# Patient Record
Sex: FEMALE | Race: WHITE | NOT HISPANIC OR LATINO
[De-identification: names, ages, dates, MRNs, and addresses within clinical notes are randomized per-mention and may not be internally consistent; named-entity substitution may affect disease eponyms.]

---
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
## 2021-03-20 ENCOUNTER — TRANSCRIPTION ENCOUNTER (OUTPATIENT)
Age: 21
End: 2021-03-20

## 2021-03-31 ENCOUNTER — TRANSCRIPTION ENCOUNTER (OUTPATIENT)
Age: 21
End: 2021-03-31

## 2021-04-18 ENCOUNTER — TRANSCRIPTION ENCOUNTER (OUTPATIENT)
Age: 21
End: 2021-04-18

## 2021-04-20 ENCOUNTER — EMERGENCY (EMERGENCY)
Facility: HOSPITAL | Age: 21
LOS: 0 days | Discharge: HOME | End: 2021-04-21
Attending: EMERGENCY MEDICINE | Admitting: EMERGENCY MEDICINE
Payer: MEDICAID

## 2021-04-20 VITALS
WEIGHT: 135.14 LBS | RESPIRATION RATE: 20 BRPM | DIASTOLIC BLOOD PRESSURE: 68 MMHG | TEMPERATURE: 99 F | HEART RATE: 108 BPM | OXYGEN SATURATION: 99 % | SYSTOLIC BLOOD PRESSURE: 133 MMHG

## 2021-04-20 DIAGNOSIS — M54.5 LOW BACK PAIN: ICD-10-CM

## 2021-04-20 LAB
APPEARANCE UR: ABNORMAL
BILIRUB UR-MCNC: NEGATIVE — SIGNIFICANT CHANGE UP
COLOR SPEC: YELLOW — SIGNIFICANT CHANGE UP
D DIMER BLD IA.RAPID-MCNC: 78 NG/ML DDU — SIGNIFICANT CHANGE UP (ref 0–230)
DIFF PNL FLD: NEGATIVE — SIGNIFICANT CHANGE UP
GLUCOSE UR QL: NEGATIVE — SIGNIFICANT CHANGE UP
HCT VFR BLD CALC: 37.9 % — SIGNIFICANT CHANGE UP (ref 37–47)
HGB BLD-MCNC: 12.7 G/DL — SIGNIFICANT CHANGE UP (ref 12–16)
KETONES UR-MCNC: NEGATIVE — SIGNIFICANT CHANGE UP
LEUKOCYTE ESTERASE UR-ACNC: ABNORMAL
MCHC RBC-ENTMCNC: 28.5 PG — SIGNIFICANT CHANGE UP (ref 27–31)
MCHC RBC-ENTMCNC: 33.5 G/DL — SIGNIFICANT CHANGE UP (ref 32–37)
MCV RBC AUTO: 85.2 FL — SIGNIFICANT CHANGE UP (ref 81–99)
NITRITE UR-MCNC: NEGATIVE — SIGNIFICANT CHANGE UP
NRBC # BLD: 0 /100 WBCS — SIGNIFICANT CHANGE UP (ref 0–0)
PH UR: 6.5 — SIGNIFICANT CHANGE UP (ref 5–8)
PLATELET # BLD AUTO: 275 K/UL — SIGNIFICANT CHANGE UP (ref 130–400)
PROT UR-MCNC: SIGNIFICANT CHANGE UP
RBC # BLD: 4.45 M/UL — SIGNIFICANT CHANGE UP (ref 4.2–5.4)
RBC # FLD: 13.2 % — SIGNIFICANT CHANGE UP (ref 11.5–14.5)
SP GR SPEC: 1.02 — SIGNIFICANT CHANGE UP (ref 1.01–1.03)
UROBILINOGEN FLD QL: SIGNIFICANT CHANGE UP
WBC # BLD: 8.11 K/UL — SIGNIFICANT CHANGE UP (ref 4.8–10.8)
WBC # FLD AUTO: 8.11 K/UL — SIGNIFICANT CHANGE UP (ref 4.8–10.8)

## 2021-04-20 PROCEDURE — 99285 EMERGENCY DEPT VISIT HI MDM: CPT

## 2021-04-20 NOTE — ED PEDIATRIC NURSE NOTE - OBJECTIVE STATEMENT
patient complaints of generalized back pain.  Patient denies chest pain, n/v, SOB, and fever. Patient reports being covid+ since march 20.

## 2021-04-20 NOTE — ED PEDIATRIC TRIAGE NOTE - CHIEF COMPLAINT QUOTE
pt c/o lower back pain, worse upon movement and palpation. tested + for covid on 3/20 but tested + again today. denies any cough, fever. sob

## 2021-04-20 NOTE — ED PEDIATRIC NURSE NOTE - GASTROINTESTINAL ASSESSMENT
Increase your fluids. You are getting dehydrated.  If you are feeling worse or have dizziness, weakness, ms cramps go to the ER. Diarrhea would make it more important to go to the ER.    - - -

## 2021-04-20 NOTE — ED PEDIATRIC NURSE NOTE - AGE
Pt BIBA for public EtOH intox. Admits to having heavy EtOH intake today.  Denies drug use or other illicits. No acute medical complaints or apparent trauma noted.  Minimally cooperative with hx and ROS due to heavy intox. No bleeding, respiratory distress, pain, vomiting, or urinary/bowel incontinence noted.
(1) 13 years and above

## 2021-04-20 NOTE — ED PEDIATRIC TRIAGE NOTE - NS ED NURSE BANDS TYPE
Name band;
Jeb Kevin (MD), Surgery  [address]  [address]  [place], [place] [address]  Phone: [phone]  Fax: [phone]

## 2021-04-21 VITALS
SYSTOLIC BLOOD PRESSURE: 124 MMHG | RESPIRATION RATE: 18 BRPM | TEMPERATURE: 98 F | HEART RATE: 72 BPM | DIASTOLIC BLOOD PRESSURE: 70 MMHG | OXYGEN SATURATION: 98 %

## 2021-04-21 LAB
ALBUMIN SERPL ELPH-MCNC: 4.1 G/DL — SIGNIFICANT CHANGE UP (ref 3.5–5.2)
ALP SERPL-CCNC: 70 U/L — SIGNIFICANT CHANGE UP (ref 30–115)
ALT FLD-CCNC: 22 U/L — SIGNIFICANT CHANGE UP (ref 14–37)
ANION GAP SERPL CALC-SCNC: 10 MMOL/L — SIGNIFICANT CHANGE UP (ref 7–14)
AST SERPL-CCNC: 16 U/L — SIGNIFICANT CHANGE UP (ref 14–37)
BACTERIA # UR AUTO: NEGATIVE — SIGNIFICANT CHANGE UP
BILIRUB SERPL-MCNC: 0.4 MG/DL — SIGNIFICANT CHANGE UP (ref 0.2–1.2)
BUN SERPL-MCNC: 10 MG/DL — SIGNIFICANT CHANGE UP (ref 10–20)
CALCIUM SERPL-MCNC: 9.1 MG/DL — SIGNIFICANT CHANGE UP (ref 8.5–10.1)
CHLORIDE SERPL-SCNC: 107 MMOL/L — SIGNIFICANT CHANGE UP (ref 98–110)
CO2 SERPL-SCNC: 23 MMOL/L — SIGNIFICANT CHANGE UP (ref 17–32)
CREAT SERPL-MCNC: 0.7 MG/DL — SIGNIFICANT CHANGE UP (ref 0.3–1)
EPI CELLS # UR: 2 /HPF — SIGNIFICANT CHANGE UP (ref 0–5)
GLUCOSE SERPL-MCNC: 76 MG/DL — SIGNIFICANT CHANGE UP (ref 70–99)
HCG SERPL QL: NEGATIVE — SIGNIFICANT CHANGE UP
HYALINE CASTS # UR AUTO: 1 /LPF — SIGNIFICANT CHANGE UP (ref 0–7)
POTASSIUM SERPL-MCNC: 4.6 MMOL/L — SIGNIFICANT CHANGE UP (ref 3.5–5)
POTASSIUM SERPL-SCNC: 4.6 MMOL/L — SIGNIFICANT CHANGE UP (ref 3.5–5)
PROT SERPL-MCNC: 7.1 G/DL — SIGNIFICANT CHANGE UP (ref 6.1–8)
RBC CASTS # UR COMP ASSIST: 5 /HPF — HIGH (ref 0–4)
SODIUM SERPL-SCNC: 140 MMOL/L — SIGNIFICANT CHANGE UP (ref 135–146)
WBC UR QL: 8 /HPF — HIGH (ref 0–5)

## 2021-04-21 PROCEDURE — 93010 ELECTROCARDIOGRAM REPORT: CPT

## 2021-04-21 PROCEDURE — 71045 X-RAY EXAM CHEST 1 VIEW: CPT | Mod: 26

## 2021-04-21 NOTE — ED PROVIDER NOTE - NSFOLLOWUPCLINICS_GEN_ALL_ED_FT
A Family Medicine Doctor  Family Medicine  .  [place]   Phone:   Fax:   Follow Up Time: 1-3 Days    [facility] ([facility])  Rehabilitation  [facility] [address], [address] [place] [address]  Phone: [phone]  Fax:   Follow Up Time: 1-3 Days    [facility] ([facility])  Rehabilitation  [address] [place] [address]  Phone: [phone]  Fax:   Follow Up Time: 1-3 Days

## 2021-04-21 NOTE — ED PROVIDER NOTE - OBJECTIVE STATEMENT
19 y/o F without significant PMH presents with moderate constant unilateral lower back pain x days, worse with movement/palpation, better with certain positions. recently covid +3/20 and then again today.   no cp/sob/cough/congestion/runny nose.   no fever.

## 2021-04-21 NOTE — ED PROVIDER NOTE - PROGRESS NOTE DETAILS
ODESSA: Reviewed all results and necessity for follow up. Counseled on red flags and to return for them.  Patient appears well on discharge.

## 2021-04-21 NOTE — ED PROVIDER NOTE - NS ED ROS FT
Review of Systems    Constitutional: (-) fever   Eyes/ENT: (-) vision changes  Cardiovascular: (-) chest pain, (-) syncope (-) palpitations  Respiratory: (-) cough, (-) shortness of breath  Gastrointestinal: (-) vomiting, (-) diarrhea (-) abdominal pain  Genitourinary:  (-) dysuria   Musculoskeletal: (-) neck pain, (+) back pain, (-) leg pain/swelling  Integumentary: (-) rash, (-) edema  Neurological: (-) headache, (-) confusion  Hematologic: (-) easy bruising

## 2021-04-21 NOTE — ED PROVIDER NOTE - PATIENT PORTAL LINK FT
You can access the FollowMyHealth Patient Portal offered by [facility] by registering at the following website: [email or web address]. By joining [facility]’s FollowMyHealth portal, you will also be able to view your health information using other applications (apps) compatible with our system.

## 2021-04-21 NOTE — ED PROVIDER NOTE - PHYSICAL EXAMINATION
PHYSICAL EXAM:    GENERAL: Alert, appears stated age, well appearing, non-toxic  SKIN: Warm, pink and dry. MMM.   HEAD: NC   EYE: Normal lids/conjunctiva  ENT: Normal hearing, patent oropharynx   NECK: +supple. No meningismus, or JVD  Pulm: Bilateral BS, normal resp effort, no wheezes, stridor, or retractions  CV: RRR, no M/R/G, 2+and = radial pulses  Abd: soft, non-tender, non-distended, no rebound/guarding.   Mskel: no erythema, cyanosis, edema. no calf tenderness  Neuro: AAOx3, 5/5 strength throughout. normal gait.

## 2021-04-21 NOTE — ED PROVIDER NOTE - ATTENDING CONTRIBUTION TO CARE
I personally evaluated the patient. I reviewed the Resident’s or Physician Assistant’s note (as assigned above), and agree with the findings and plan except as documented in my note.    21 y/o F without significant PMH presents with moderate constant unilateral lower back pain x days, worse with movement. No rash. No fever. No dysuria or hematuria.    CONSTITUTIONAL: Well-developed; well-nourished; in no acute distress. Sitting up and providing appropriate history and physical examination  SKIN: skin exam is warm and dry, no acute rash.  HEAD: Normocephalic; atraumatic.  EYES: PERRL, 3 mm bilateral, no nystagmus, EOM intact; conjunctiva and sclera clear.  ENT: No nasal discharge; airway clear.  NECK: Supple; non tender.+ full passive ROM in all directions. No JVD  CARD: S1, S2 normal; no murmurs, gallops, or rubs. Regular rate and rhythm. + Symmetric Strong Pulses  RESP: No wheezes, rales or rhonchi. Good air movement bilaterally  ABD: soft; non-distended; non-tender. No Rebound, No Gaurding, No signs of peritnitis, No CVA tenderness  EXT: Normal ROM. No clubbing, cyanosis or edema. Dp and Pt Pulses intact. Cap refill less than 3 seconds  Back:  lower paraspinal ttp. No CVA tenderness.

## 2021-11-20 ENCOUNTER — TRANSCRIPTION ENCOUNTER (OUTPATIENT)
Age: 21
End: 2021-11-20

## 2022-02-03 ENCOUNTER — EMERGENCY (EMERGENCY)
Facility: HOSPITAL | Age: 22
LOS: 0 days | Discharge: HOME | End: 2022-02-03
Attending: EMERGENCY MEDICINE | Admitting: EMERGENCY MEDICINE
Payer: MEDICAID

## 2022-02-03 VITALS
DIASTOLIC BLOOD PRESSURE: 59 MMHG | SYSTOLIC BLOOD PRESSURE: 125 MMHG | TEMPERATURE: 98 F | OXYGEN SATURATION: 100 % | HEART RATE: 84 BPM | RESPIRATION RATE: 19 BRPM

## 2022-02-03 VITALS
TEMPERATURE: 99 F | RESPIRATION RATE: 18 BRPM | SYSTOLIC BLOOD PRESSURE: 127 MMHG | DIASTOLIC BLOOD PRESSURE: 60 MMHG | OXYGEN SATURATION: 100 % | HEART RATE: 75 BPM

## 2022-02-03 DIAGNOSIS — R10.13 EPIGASTRIC PAIN: ICD-10-CM

## 2022-02-03 DIAGNOSIS — R10.84 GENERALIZED ABDOMINAL PAIN: ICD-10-CM

## 2022-02-03 DIAGNOSIS — R11.2 NAUSEA WITH VOMITING, UNSPECIFIED: ICD-10-CM

## 2022-02-03 DIAGNOSIS — N39.0 URINARY TRACT INFECTION, SITE NOT SPECIFIED: ICD-10-CM

## 2022-02-03 LAB
ALBUMIN SERPL ELPH-MCNC: 4.2 G/DL — SIGNIFICANT CHANGE UP (ref 3.5–5.2)
ALP SERPL-CCNC: 67 U/L — SIGNIFICANT CHANGE UP (ref 30–115)
ALT FLD-CCNC: 38 U/L — HIGH (ref 14–37)
ANION GAP SERPL CALC-SCNC: 12 MMOL/L — SIGNIFICANT CHANGE UP (ref 7–14)
APPEARANCE UR: ABNORMAL
AST SERPL-CCNC: 48 U/L — HIGH (ref 14–37)
BACTERIA # UR AUTO: ABNORMAL
BASOPHILS # BLD AUTO: 0.01 K/UL — SIGNIFICANT CHANGE UP (ref 0–0.2)
BASOPHILS NFR BLD AUTO: 0.2 % — SIGNIFICANT CHANGE UP (ref 0–1)
BILIRUB DIRECT SERPL-MCNC: <0.2 MG/DL — SIGNIFICANT CHANGE UP (ref 0–0.3)
BILIRUB INDIRECT FLD-MCNC: >0.2 MG/DL — SIGNIFICANT CHANGE UP (ref 0.2–1.2)
BILIRUB SERPL-MCNC: 0.4 MG/DL — SIGNIFICANT CHANGE UP (ref 0.2–1.2)
BILIRUB UR-MCNC: NEGATIVE — SIGNIFICANT CHANGE UP
BUN SERPL-MCNC: 10 MG/DL — SIGNIFICANT CHANGE UP (ref 10–20)
CALCIUM SERPL-MCNC: 9.1 MG/DL — SIGNIFICANT CHANGE UP (ref 8.5–10.1)
CHLORIDE SERPL-SCNC: 102 MMOL/L — SIGNIFICANT CHANGE UP (ref 98–110)
CO2 SERPL-SCNC: 21 MMOL/L — SIGNIFICANT CHANGE UP (ref 17–32)
COLOR SPEC: SIGNIFICANT CHANGE UP
CREAT SERPL-MCNC: 0.6 MG/DL — LOW (ref 0.7–1.5)
DIFF PNL FLD: NEGATIVE — SIGNIFICANT CHANGE UP
EOSINOPHIL # BLD AUTO: 0.05 K/UL — SIGNIFICANT CHANGE UP (ref 0–0.7)
EOSINOPHIL NFR BLD AUTO: 1 % — SIGNIFICANT CHANGE UP (ref 0–8)
EPI CELLS # UR: 6 /HPF — HIGH (ref 0–5)
GLUCOSE SERPL-MCNC: 83 MG/DL — SIGNIFICANT CHANGE UP (ref 70–99)
GLUCOSE UR QL: NEGATIVE — SIGNIFICANT CHANGE UP
HCG SERPL QL: NEGATIVE — SIGNIFICANT CHANGE UP
HCT VFR BLD CALC: 41.3 % — SIGNIFICANT CHANGE UP (ref 37–47)
HGB BLD-MCNC: 13.5 G/DL — SIGNIFICANT CHANGE UP (ref 12–16)
HYALINE CASTS # UR AUTO: 4 /LPF — SIGNIFICANT CHANGE UP (ref 0–7)
IMM GRANULOCYTES NFR BLD AUTO: 0.4 % — HIGH (ref 0.1–0.3)
KETONES UR-MCNC: NEGATIVE — SIGNIFICANT CHANGE UP
LEUKOCYTE ESTERASE UR-ACNC: ABNORMAL
LIDOCAIN IGE QN: 26 U/L — SIGNIFICANT CHANGE UP (ref 7–60)
LYMPHOCYTES # BLD AUTO: 1.81 K/UL — SIGNIFICANT CHANGE UP (ref 1.2–3.4)
LYMPHOCYTES # BLD AUTO: 37.2 % — SIGNIFICANT CHANGE UP (ref 20.5–51.1)
MCHC RBC-ENTMCNC: 28 PG — SIGNIFICANT CHANGE UP (ref 27–31)
MCHC RBC-ENTMCNC: 32.7 G/DL — SIGNIFICANT CHANGE UP (ref 32–37)
MCV RBC AUTO: 85.7 FL — SIGNIFICANT CHANGE UP (ref 81–99)
MONOCYTES # BLD AUTO: 0.51 K/UL — SIGNIFICANT CHANGE UP (ref 0.1–0.6)
MONOCYTES NFR BLD AUTO: 10.5 % — HIGH (ref 1.7–9.3)
NEUTROPHILS # BLD AUTO: 2.47 K/UL — SIGNIFICANT CHANGE UP (ref 1.4–6.5)
NEUTROPHILS NFR BLD AUTO: 50.7 % — SIGNIFICANT CHANGE UP (ref 42.2–75.2)
NITRITE UR-MCNC: NEGATIVE — SIGNIFICANT CHANGE UP
NRBC # BLD: 0 /100 WBCS — SIGNIFICANT CHANGE UP (ref 0–0)
PH UR: 7 — SIGNIFICANT CHANGE UP (ref 5–8)
PLATELET # BLD AUTO: 259 K/UL — SIGNIFICANT CHANGE UP (ref 130–400)
POTASSIUM SERPL-MCNC: 5.3 MMOL/L — HIGH (ref 3.5–5)
POTASSIUM SERPL-SCNC: 5.3 MMOL/L — HIGH (ref 3.5–5)
PROT SERPL-MCNC: 7.4 G/DL — SIGNIFICANT CHANGE UP (ref 6–8)
PROT UR-MCNC: SIGNIFICANT CHANGE UP
RBC # BLD: 4.82 M/UL — SIGNIFICANT CHANGE UP (ref 4.2–5.4)
RBC # FLD: 13 % — SIGNIFICANT CHANGE UP (ref 11.5–14.5)
RBC CASTS # UR COMP ASSIST: 11 /HPF — HIGH (ref 0–4)
SODIUM SERPL-SCNC: 135 MMOL/L — SIGNIFICANT CHANGE UP (ref 135–146)
SP GR SPEC: 1.01 — SIGNIFICANT CHANGE UP (ref 1.01–1.03)
UROBILINOGEN FLD QL: SIGNIFICANT CHANGE UP
WBC # BLD: 4.87 K/UL — SIGNIFICANT CHANGE UP (ref 4.8–10.8)
WBC # FLD AUTO: 4.87 K/UL — SIGNIFICANT CHANGE UP (ref 4.8–10.8)
WBC UR QL: 25 /HPF — HIGH (ref 0–5)

## 2022-02-03 PROCEDURE — 74177 CT ABD & PELVIS W/CONTRAST: CPT | Mod: 26,MA

## 2022-02-03 PROCEDURE — 76705 ECHO EXAM OF ABDOMEN: CPT | Mod: 26

## 2022-02-03 PROCEDURE — 99285 EMERGENCY DEPT VISIT HI MDM: CPT

## 2022-02-03 PROCEDURE — 76856 US EXAM PELVIC COMPLETE: CPT | Mod: 26

## 2022-02-03 RX ORDER — ACETAMINOPHEN 500 MG
650 TABLET ORAL ONCE
Refills: 0 | Status: COMPLETED | OUTPATIENT
Start: 2022-02-03 | End: 2022-02-03

## 2022-02-03 RX ORDER — IOHEXOL 300 MG/ML
30 INJECTION, SOLUTION INTRAVENOUS ONCE
Refills: 0 | Status: COMPLETED | OUTPATIENT
Start: 2022-02-03 | End: 2022-02-03

## 2022-02-03 RX ORDER — CEFPODOXIME PROXETIL 100 MG
1 TABLET ORAL
Qty: 14 | Refills: 0
Start: 2022-02-03 | End: 2022-02-09

## 2022-02-03 RX ORDER — FAMOTIDINE 10 MG/ML
20 INJECTION INTRAVENOUS ONCE
Refills: 0 | Status: COMPLETED | OUTPATIENT
Start: 2022-02-03 | End: 2022-02-03

## 2022-02-03 RX ORDER — SODIUM CHLORIDE 9 MG/ML
1000 INJECTION, SOLUTION INTRAVENOUS ONCE
Refills: 0 | Status: COMPLETED | OUTPATIENT
Start: 2022-02-03 | End: 2022-02-03

## 2022-02-03 RX ORDER — ONDANSETRON 8 MG/1
4 TABLET, FILM COATED ORAL ONCE
Refills: 0 | Status: COMPLETED | OUTPATIENT
Start: 2022-02-03 | End: 2022-02-03

## 2022-02-03 RX ORDER — DIPHENHYDRAMINE HYDROCHLORIDE AND LIDOCAINE HYDROCHLORIDE AND ALUMINUM HYDROXIDE AND MAGNESIUM HYDRO
30 KIT ONCE
Refills: 0 | Status: COMPLETED | OUTPATIENT
Start: 2022-02-03 | End: 2022-02-03

## 2022-02-03 RX ORDER — CEFTRIAXONE 500 MG/1
1000 INJECTION, POWDER, FOR SOLUTION INTRAMUSCULAR; INTRAVENOUS ONCE
Refills: 0 | Status: COMPLETED | OUTPATIENT
Start: 2022-02-03 | End: 2022-02-03

## 2022-02-03 RX ORDER — ONDANSETRON 8 MG/1
1 TABLET, FILM COATED ORAL
Qty: 6 | Refills: 0
Start: 2022-02-03 | End: 2022-02-04

## 2022-02-03 RX ADMIN — CEFTRIAXONE 100 MILLIGRAM(S): 500 INJECTION, POWDER, FOR SOLUTION INTRAMUSCULAR; INTRAVENOUS at 22:24

## 2022-02-03 RX ADMIN — FAMOTIDINE 20 MILLIGRAM(S): 10 INJECTION INTRAVENOUS at 14:36

## 2022-02-03 RX ADMIN — IOHEXOL 30 MILLILITER(S): 300 INJECTION, SOLUTION INTRAVENOUS at 15:13

## 2022-02-03 RX ADMIN — DIPHENHYDRAMINE HYDROCHLORIDE AND LIDOCAINE HYDROCHLORIDE AND ALUMINUM HYDROXIDE AND MAGNESIUM HYDRO 30 MILLILITER(S): KIT at 22:24

## 2022-02-03 RX ADMIN — ONDANSETRON 4 MILLIGRAM(S): 8 TABLET, FILM COATED ORAL at 14:36

## 2022-02-03 RX ADMIN — Medication 650 MILLIGRAM(S): at 14:36

## 2022-02-03 RX ADMIN — SODIUM CHLORIDE 1000 MILLILITER(S): 9 INJECTION, SOLUTION INTRAVENOUS at 14:37

## 2022-02-03 RX ADMIN — SODIUM CHLORIDE 1000 MILLILITER(S): 9 INJECTION, SOLUTION INTRAVENOUS at 19:44

## 2022-02-03 RX ADMIN — Medication 650 MILLIGRAM(S): at 21:22

## 2022-02-03 RX ADMIN — ONDANSETRON 4 MILLIGRAM(S): 8 TABLET, FILM COATED ORAL at 22:24

## 2022-02-03 NOTE — ED PEDIATRIC TRIAGE NOTE - BRAND OF FIRST COVID-19 BOOSTER
Patient Requesting a refill.    Medication(s) for [employer] submitted for a refill request and is pending approval from the Provider.    Caller has been advised that their call does not guarantee an immediate refill. This refill will be reviewed within 24-72 hours by a qualified provider who will determine whether he or she can refill the medication.    Patient has contacted the pharmacy?  Yes    Call Back Number: [phone]    Can a detailed message be left? Yes_No_---: Yes    Additional information:     Patient will run out before her script comes from mail order , asking for a small script sent to local [facility].Patient is requesting be done today so she can  tomorrow while she is out.     Patient’s preferred pharmacy has been noted and populated.       [facility]/pharmacy #[id number] - [place], [place] - [address] [facility] [address] [facility]  [address] [place] [address]  Phone: [phone] Fax: [phone]     [employer]

## 2022-02-03 NOTE — ED PROVIDER NOTE - NS ED ROS FT
CONSTITUTIONAL:  see HPI  SKIN:  no skin rash  EYES:  no visual changes  ENMT: no neck pain or stiffness  CARD:  no chest pain  RESP:  no cough or respiratory distress  ABD:  + abdominal pain, nausea, and vomiting; no diarrhea  :  no dysuria, frequency or burning  MSK:  no new-onset back pain  NEURO:  no headache   Except as documented in the HPI,  all other systems are negative

## 2022-02-03 NOTE — ED PROVIDER NOTE - PROGRESS NOTE DETAILS
TD: Pt reports improvement in epigastric abd pain from 6/10 on arrival to 3/10. Nausea has also improved, now only mild. No emesis. Pt back from ultrasound RUQ, pending bladder being full then will send back for US pelvis. Pt currently drinking contrast for CT abd/pelvis. Will reassess. Patient endorsed to Dr. Perez, will follow. AN: Sign out received from Dr. Hernandez  Pt evaluated by me. Presented with wpigastric abd pain with radiation to the lowr abd. Required labs, meds, imaging. Was found to have a UTI. Pending CT results and will dispo accordingly. AN: Sign out received from Dr. Hernandez  Pt evaluated by me. Presented with lower abd pain. Required labs, meds, imaging. Was found to have a UTI. Pending CT results and will dispo accordingly.

## 2022-02-03 NOTE — ED PEDIATRIC NURSE NOTE - OBJECTIVE STATEMENT
Pt presents to ED c/o pain in the middle of her belly that also goes to her sides; pt also reports she has been vomiting a few times since Saturday.

## 2022-02-03 NOTE — ED PROVIDER NOTE - PATIENT PORTAL LINK FT
You can access the FollowMyHealth Patient Portal offered by [facility] by registering at the following website: [email or web address]. By joining [facility]’s FollowMyHealth portal, you will also be able to view your health information using other applications (apps) compatible with our system.

## 2022-02-03 NOTE — ED PROVIDER NOTE - ATTENDING CONTRIBUTION TO CARE
I personally evaluated the patient. I reviewed the Resident´s or Physician Assistant´s note (as assigned above), and agree with the findings and plan except as documented in my note.  21-year-old female presents to the emergency department for evaluation of abdominal pain that has been present for the last 3 to 4 days.  No fever, positive vomiting, no diarrhea.  No sick contacts.  PMD was contacted over the phone and advised to come to the ED for evaluation.  She was not examined by the PMD.  She describes the pain as being worse in the upper middle part of her abdomen but also going down to her lower abdomen.  She is not sexually active  Physical Exam: VS reviewed. Pt is well appearing, in no respiratory distress. MMM. Cap refill <2 seconds. Skin with no obvious rash noted.  Chest CTA BL, no wheezing, rales or crackles, good air entry BL.  Normal heart sounds, no murmurs appreciated, no reproducible chest wall pain. Abdomen soft, ND, no guarding, + tenderness appreciated throughout abdomen, worse in epigastric region and BL lower quadrants, no CVA tenderness. Neuro exam grossly intact.      Plan: IV, check labs, urine studies, ultrasound right upper quadrant, ultrasound pelvis.  Abdominal and pelvic CT with p.o. and IV contrast

## 2022-02-03 NOTE — ED PROVIDER NOTE - CLINICAL SUMMARY MEDICAL DECISION MAKING FREE TEXT BOX
Pt presented with lower abd pain. Required labs, meds, imaging. Was found to have a UTI. CT results neg. Will dc with outpt f/up.

## 2022-02-03 NOTE — ED PROVIDER NOTE - PHYSICAL EXAMINATION
CONSTITUTIONAL:  NAD  SKIN:  warm, dry  HEAD:  NCAT  EYES:  NL inspection  ENT:  MMM  NECK:  supple; non tender  CARD:  RRR  RESP:  CTAB  ABD:  + TTP primarily epigastrium, mild-moderate RUQ, LUQ, and suprapubic TTP, abd S/ND, no R/G  NEURO:  grossly unremarkable  PSYCH:  cooperative, appropriate

## 2022-02-03 NOTE — ED PROVIDER NOTE - CARE PROVIDER_API CALL
Keerthi Vo  Internal Medicine  [address] [place] [address]  Phone: [phone]  Fax: [phone]  Established Patient  Follow Up Time: 7-10 Days

## 2022-02-03 NOTE — ED PEDIATRIC TRIAGE NOTE - CHIEF COMPLAINT QUOTE
"I have this pain in the middle of my belly going to my sides and I've been vomiting a few times since Saturday." none known

## 2022-02-03 NOTE — ED PEDIATRIC NURSE NOTE - CHIEF COMPLAINT QUOTE
"I have this pain in the middle of my belly going to my sides and I've been vomiting a few times since Saturday."

## 2022-02-03 NOTE — ED PROVIDER NOTE - OBJECTIVE STATEMENT
Pt is a 21F with no pmhx p/w generalized abdominal pain x5d, worst in epigastric and mid-abd regions, sharp in character, worst after eating with associated nausea and 1-2x NBNB emesis per day.  No fever, diarrhea, dysuria/frequency, flank pain, vaginal discharge.  No sick contacts.  PMD was contacted over the phone and advised to come to the ED for evaluation.  She was not examined by the PMD. She is not sexually active currently or in the past, LMP started on 1.23.22 and is almost ended with only minimal spotting currently.

## 2022-02-04 LAB
CULTURE RESULTS: SIGNIFICANT CHANGE UP
SPECIMEN SOURCE: SIGNIFICANT CHANGE UP

## 2022-03-11 ENCOUNTER — APPOINTMENT (OUTPATIENT)
Dept: SURGERY | Facility: CLINIC | Age: 22
End: 2022-03-11
Payer: MEDICAID

## 2022-03-11 VITALS
SYSTOLIC BLOOD PRESSURE: 110 MMHG | DIASTOLIC BLOOD PRESSURE: 77 MMHG | BODY MASS INDEX: 27.21 KG/M2 | HEIGHT: 59 IN | OXYGEN SATURATION: 95 % | TEMPERATURE: 97.3 F | HEART RATE: 94 BPM | WEIGHT: 135 LBS

## 2022-03-11 DIAGNOSIS — Z84.0 FAMILY HISTORY OF DISEASES OF THE SKIN AND SUBCUTANEOUS TISSUE: ICD-10-CM

## 2022-03-11 PROCEDURE — 99203 OFFICE O/P NEW LOW 30 MIN: CPT

## 2022-03-11 NOTE — HISTORY OF PRESENT ILLNESS
[de-identified] : 22yo female with no significant PMHx presenting for evaluation of pilonidal cyst. Patient states she had an initial episode 1-2 years ago, requiring incision and drainage. She now has a draining sore on the sacral region, which is being treated with Augmentin. She denies fever/chills. Complains of localized pain and yellow drainage.

## 2022-03-11 NOTE — ASSESSMENT
[FreeTextEntry1] : 22yo female with no significant PMHx presenting with pilonidal cyst.\par -risks, benefits, and alteratives were explained to the patient - including risk of bleeding, pain, and infection\par -all questions were answered\par -consent was obtained for EXCISION OF PILONIDAL CYST\par -waive PAST's\par -return to office post-operatively\par

## 2022-03-11 NOTE — PHYSICAL EXAM
[Normal Breath Sounds] : Normal breath sounds [Normal Heart Sounds] : normal heart sounds [Calm] : calm [de-identified] : no acute distress [de-identified] : n [de-identified] : soft [de-identified] : sacral induration, no expressible drainage, no erythema, localized tenderness mild, few sinus tracts, +hirsuitism [de-identified] : full ROM x 4

## 2022-03-25 ENCOUNTER — NON-APPOINTMENT (OUTPATIENT)
Age: 22
End: 2022-03-25

## 2022-03-28 ENCOUNTER — LABORATORY RESULT (OUTPATIENT)
Age: 22
End: 2022-03-28

## 2022-03-30 NOTE — ASU PATIENT PROFILE, ADULT - FALL HARM RISK - UNIVERSAL INTERVENTIONS
Bed in lowest position, wheels locked, appropriate side rails in place/Call bell, personal items and telephone in reach/Instruct patient to call for assistance before getting out of bed or chair/Non-slip footwear when patient is out of bed/[place] to call system/Physically safe environment - no spills, clutter or unnecessary equipment/Purposeful Proactive Rounding/Room/bathroom lighting operational, light cord in reach

## 2022-03-31 ENCOUNTER — INPATIENT (INPATIENT)
Facility: HOSPITAL | Age: 22
LOS: 1 days | Discharge: HOME | End: 2022-04-02
Attending: STUDENT IN AN ORGANIZED HEALTH CARE EDUCATION/TRAINING PROGRAM | Admitting: STUDENT IN AN ORGANIZED HEALTH CARE EDUCATION/TRAINING PROGRAM
Payer: MEDICAID

## 2022-03-31 ENCOUNTER — RESULT REVIEW (OUTPATIENT)
Age: 22
End: 2022-03-31

## 2022-03-31 ENCOUNTER — TRANSCRIPTION ENCOUNTER (OUTPATIENT)
Age: 22
End: 2022-03-31

## 2022-03-31 VITALS
TEMPERATURE: 99 F | HEART RATE: 74 BPM | DIASTOLIC BLOOD PRESSURE: 76 MMHG | OXYGEN SATURATION: 99 % | RESPIRATION RATE: 18 BRPM | WEIGHT: 139.99 LBS | SYSTOLIC BLOOD PRESSURE: 118 MMHG | HEIGHT: 60 IN

## 2022-03-31 DIAGNOSIS — K21.9 GASTRO-ESOPHAGEAL REFLUX DISEASE WITHOUT ESOPHAGITIS: ICD-10-CM

## 2022-03-31 DIAGNOSIS — L05.91 PILONIDAL CYST WITHOUT ABSCESS: ICD-10-CM

## 2022-03-31 DIAGNOSIS — G92.9 UNSPECIFIED TOXIC ENCEPHALOPATHY: ICD-10-CM

## 2022-03-31 DIAGNOSIS — Z98.890 OTHER SPECIFIED POSTPROCEDURAL STATES: Chronic | ICD-10-CM

## 2022-03-31 DIAGNOSIS — T41.45XA ADVERSE EFFECT OF UNSPECIFIED ANESTHETIC, INITIAL ENCOUNTER: ICD-10-CM

## 2022-03-31 LAB
ANION GAP SERPL CALC-SCNC: 14 MMOL/L — SIGNIFICANT CHANGE UP (ref 7–14)
BASOPHILS # BLD AUTO: 0.01 K/UL — SIGNIFICANT CHANGE UP (ref 0–0.2)
BASOPHILS NFR BLD AUTO: 0.1 % — SIGNIFICANT CHANGE UP (ref 0–1)
BUN SERPL-MCNC: 9 MG/DL — LOW (ref 10–20)
CALCIUM SERPL-MCNC: 9.4 MG/DL — SIGNIFICANT CHANGE UP (ref 8.5–10.1)
CHLORIDE SERPL-SCNC: 106 MMOL/L — SIGNIFICANT CHANGE UP (ref 98–110)
CO2 SERPL-SCNC: 21 MMOL/L — SIGNIFICANT CHANGE UP (ref 17–32)
CREAT SERPL-MCNC: 0.6 MG/DL — LOW (ref 0.7–1.5)
EGFR: 132 ML/MIN/1.73M2 — SIGNIFICANT CHANGE UP
EOSINOPHIL # BLD AUTO: 0 K/UL — SIGNIFICANT CHANGE UP (ref 0–0.7)
EOSINOPHIL NFR BLD AUTO: 0 % — SIGNIFICANT CHANGE UP (ref 0–8)
GLUCOSE SERPL-MCNC: 110 MG/DL — HIGH (ref 70–99)
HCT VFR BLD CALC: 39.9 % — SIGNIFICANT CHANGE UP (ref 37–47)
HGB BLD-MCNC: 13.4 G/DL — SIGNIFICANT CHANGE UP (ref 12–16)
IMM GRANULOCYTES NFR BLD AUTO: 0.6 % — HIGH (ref 0.1–0.3)
LYMPHOCYTES # BLD AUTO: 0.95 K/UL — LOW (ref 1.2–3.4)
LYMPHOCYTES # BLD AUTO: 11.7 % — LOW (ref 20.5–51.1)
MAGNESIUM SERPL-MCNC: 1.8 MG/DL — SIGNIFICANT CHANGE UP (ref 1.8–2.4)
MCHC RBC-ENTMCNC: 28.2 PG — SIGNIFICANT CHANGE UP (ref 27–31)
MCHC RBC-ENTMCNC: 33.6 G/DL — SIGNIFICANT CHANGE UP (ref 32–37)
MCV RBC AUTO: 83.8 FL — SIGNIFICANT CHANGE UP (ref 81–99)
MONOCYTES # BLD AUTO: 0.14 K/UL — SIGNIFICANT CHANGE UP (ref 0.1–0.6)
MONOCYTES NFR BLD AUTO: 1.7 % — SIGNIFICANT CHANGE UP (ref 1.7–9.3)
NEUTROPHILS # BLD AUTO: 6.95 K/UL — HIGH (ref 1.4–6.5)
NEUTROPHILS NFR BLD AUTO: 85.9 % — HIGH (ref 42.2–75.2)
NRBC # BLD: 0 /100 WBCS — SIGNIFICANT CHANGE UP (ref 0–0)
PHOSPHATE SERPL-MCNC: 3.4 MG/DL — SIGNIFICANT CHANGE UP (ref 2.1–4.9)
PLATELET # BLD AUTO: 321 K/UL — SIGNIFICANT CHANGE UP (ref 130–400)
POTASSIUM SERPL-MCNC: 4.2 MMOL/L — SIGNIFICANT CHANGE UP (ref 3.5–5)
POTASSIUM SERPL-SCNC: 4.2 MMOL/L — SIGNIFICANT CHANGE UP (ref 3.5–5)
RBC # BLD: 4.76 M/UL — SIGNIFICANT CHANGE UP (ref 4.2–5.4)
RBC # FLD: 13.2 % — SIGNIFICANT CHANGE UP (ref 11.5–14.5)
SODIUM SERPL-SCNC: 141 MMOL/L — SIGNIFICANT CHANGE UP (ref 135–146)
WBC # BLD: 8.1 K/UL — SIGNIFICANT CHANGE UP (ref 4.8–10.8)
WBC # FLD AUTO: 8.1 K/UL — SIGNIFICANT CHANGE UP (ref 4.8–10.8)

## 2022-03-31 PROCEDURE — 88304 TISSUE EXAM BY PATHOLOGIST: CPT | Mod: 26

## 2022-03-31 PROCEDURE — 11770 EXC PILONIDAL CYST SIMPLE: CPT

## 2022-03-31 PROCEDURE — 99232 SBSQ HOSP IP/OBS MODERATE 35: CPT | Mod: 25,GC

## 2022-03-31 RX ORDER — IBUPROFEN 200 MG
400 TABLET ORAL EVERY 8 HOURS
Refills: 0 | Status: DISCONTINUED | OUTPATIENT
Start: 2022-03-31 | End: 2022-04-01

## 2022-03-31 RX ORDER — ACETAMINOPHEN 500 MG
650 TABLET ORAL EVERY 6 HOURS
Refills: 0 | Status: DISCONTINUED | OUTPATIENT
Start: 2022-03-31 | End: 2022-04-01

## 2022-03-31 RX ORDER — IBUPROFEN 200 MG
1 TABLET ORAL
Qty: 9 | Refills: 0
Start: 2022-03-31 | End: 2022-04-02

## 2022-03-31 RX ORDER — PANTOPRAZOLE SODIUM 20 MG/1
40 TABLET, DELAYED RELEASE ORAL
Refills: 0 | Status: DISCONTINUED | OUTPATIENT
Start: 2022-03-31 | End: 2022-04-01

## 2022-03-31 RX ORDER — OXYCODONE AND ACETAMINOPHEN 5; 325 MG/1; MG/1
1 TABLET ORAL EVERY 4 HOURS
Refills: 0 | Status: DISCONTINUED | OUTPATIENT
Start: 2022-03-31 | End: 2022-03-31

## 2022-03-31 RX ORDER — ACETAMINOPHEN 500 MG
2 TABLET ORAL
Qty: 40 | Refills: 0
Start: 2022-03-31 | End: 2022-04-04

## 2022-03-31 RX ORDER — ENOXAPARIN SODIUM 100 MG/ML
40 INJECTION SUBCUTANEOUS EVERY 24 HOURS
Refills: 0 | Status: DISCONTINUED | OUTPATIENT
Start: 2022-03-31 | End: 2022-04-02

## 2022-03-31 RX ORDER — MORPHINE SULFATE 50 MG/1
2 CAPSULE, EXTENDED RELEASE ORAL
Refills: 0 | Status: DISCONTINUED | OUTPATIENT
Start: 2022-03-31 | End: 2022-03-31

## 2022-03-31 RX ORDER — OMEPRAZOLE 10 MG/1
0 CAPSULE, DELAYED RELEASE ORAL
Qty: 0 | Refills: 0 | DISCHARGE

## 2022-03-31 RX ORDER — ERGOCALCIFEROL 1.25 MG/1
0 CAPSULE ORAL
Qty: 0 | Refills: 0 | DISCHARGE

## 2022-03-31 RX ORDER — SODIUM CHLORIDE 9 MG/ML
1000 INJECTION, SOLUTION INTRAVENOUS
Refills: 0 | Status: DISCONTINUED | OUTPATIENT
Start: 2022-03-31 | End: 2022-04-01

## 2022-03-31 RX ORDER — CHLORHEXIDINE GLUCONATE 213 G/1000ML
1 SOLUTION TOPICAL
Refills: 0 | Status: DISCONTINUED | OUTPATIENT
Start: 2022-03-31 | End: 2022-04-02

## 2022-03-31 RX ADMIN — SODIUM CHLORIDE 75 MILLILITER(S): 9 INJECTION, SOLUTION INTRAVENOUS at 13:54

## 2022-03-31 RX ADMIN — Medication 400 MILLIGRAM(S): at 22:30

## 2022-03-31 RX ADMIN — Medication 400 MILLIGRAM(S): at 22:02

## 2022-03-31 NOTE — ASU DISCHARGE PLAN (ADULT/PEDIATRIC) - CARE PROVIDER_API CALL
Roro Valverde)  [facility]  [address] [place] [address], [address] [place] [address]  Phone: [phone]  Fax: [phone]  Follow Up Time:

## 2022-03-31 NOTE — ASU DISCHARGE PLAN (ADULT/PEDIATRIC) - NS MD DC FALL RISK RISK
For information on Fall & Injury Prevention, visit: [email or web address] OR  [email or web address] OR  https://www.cdc.gov/steadi/patient.html

## 2022-03-31 NOTE — H&P ADULT - NSHPPHYSICALEXAM_GEN_ALL_CORE
PHYSICAL EXAM:  GENERAL: AA, oriented to person, not to place and time  HEAD:  Atraumatic, Normocephalic  CHEST/LUNG: CTAx2  HEART: Regular rate and rhythm.   ABDOMEN: Soft, non-tender, non-distended  EXTREMITIES: No clubbing, cyanosis, or edema, intact ROM  BACK: post surgical wound with dermabond clean

## 2022-03-31 NOTE — ASU DISCHARGE PLAN (ADULT/PEDIATRIC) - CALL YOUR DOCTOR IF YOU HAVE ANY OF THE FOLLOWING:
Bleeding that does not stop/Pain not relieved by Medications/Fever greater than (need to indicate Fahrenheit or Celsius)/Wound/Surgical Site with redness, or foul smelling discharge or pus Negative

## 2022-03-31 NOTE — H&P ADULT - HISTORY OF PRESENT ILLNESS
22 y/o female w PMH of pilonidal cyst s/p pilonidal cyst removal today that is currently presenting with AMS in the PACU. Pt evaluated at bedside with mother who reports patient has been confused and delirious post operative. As per the CRNA the patient experienced an episode of alteration with SOB, hyperventilation and tachycardia that resolved after administering Precedex. Afterwards, the patient was seen sleeping in the PACU and when she woke up was scared and confused about her surroundings. The mother denies any previous similar episodes, but notes that this is note her baseline.

## 2022-03-31 NOTE — BRIEF OPERATIVE NOTE - OPERATION/FINDINGS
Pilonidal cyst removal with Karydakis flap technique. Subcuticular monocryl for skin closure with dermabond.

## 2022-03-31 NOTE — ASU DISCHARGE PLAN (ADULT/PEDIATRIC) - ASU DC SPECIAL INSTRUCTIONSFT
-Diet: Continue regular diet.  -Activity: Avoid heavy lifting or straining (anything over 10-15 pounds) for at least 6 weeks. You may ambulate and otherwise resume normal daily activities as tolerated. Use cold packs and sit on pillow for comfort.  -Incisions: Keep area dry. You may shower and allow soap and water to rinse over incisions. Please avoid scrubbing the areas and do not submerge your incisions in water for at least 2 weeks.  -Medications: You may resume your home medications. You may take Tylenol 650mg every 6 hours and alternate with Ibuprofen 600mg every 8 hours for pain; prescription has been sent to your pharmacy.  -Follow-up: Please call the office to schedule a follow-up appointment with Dr. Valverde within 1-2 weeks.  -Please call the office or return to the ED with persistent fever greater than 100.4T, bloody bowel movements, pain not controlled with medication, purulent secretions or wound opening.

## 2022-03-31 NOTE — CHART NOTE - NSCHARTNOTEFT_GEN_A_CORE
PACU ANESTHESIA ADMISSION NOTE      Procedure: Excision of pilonidal cyst  Post op diagnosis:      ____  Intubated  TV:______       Rate: ______      FiO2: ______    _x___  Patent Airway    _x___  Full return of protective reflexes    ___  Full recovery from anesthesia / back to baseline status    Vitals:            T:  98.1              BP :  124/69              R: 14             Sat:  100%             P: 75      Mental Status:  _x___ Awake   _____ Alert   _____ Drowsy   _____ Sedated    Nausea/Vomiting:  _x___  NO       ______Yes,   See Post - Op Orders         Pain Scale (0-10):  __0___    Treatment: ___ None    _x___ See Post - Op/PCA Orders    Post - Operative Fluids:   __x__ Oral   ____ See Post - Op Orders    Plan: Discharge:   _x___Home       _____Floor     _____Critical Care    _____  Other:_________________    Comments:  No anesthesia issues or complications noted.  Discharge when criteria met.

## 2022-03-31 NOTE — PATIENT PROFILE ADULT - FALL HARM RISK - HARM RISK INTERVENTIONS

## 2022-03-31 NOTE — PRE-ANESTHESIA EVALUATION ADULT - HEIGHT IN CM
Patient with chronic alcohol abuse, drinking up to 20 beers per day.  Blood alcohol level 164 upon presentation to the emergency room.  Patient was on CIWA protocol, had CIWA score up to 8 but currently down to 0.  No more Ativan 2 PM yesterday.  Patient received Ativan per protocol, folic acid, thiamine.  Patient was evaluated by chemical dependency counselor, outpatient resources information was given to the patient.  Patient will be discharged on naltrexone 50 mg daily.   152.4

## 2022-03-31 NOTE — H&P ADULT - ASSESSMENT
20 y/o female w PMH of pilonidal cyst s/p pilonidal cyst removal today that is currently presenting with AMS in the PACU. Pt evaluated at bedside AA, oriented to person, not to place and time, VS: WNL, no labs and no imaging. Pt is currently not back to her baseline and will need to be admitted for further monitoring.     PLAN:  -admit to general surgery service  -regular diet  -must not be supine, keep on lateral recumbent position  -pain control  -monitoring of any changes in mental status    spectra [phone]

## 2022-03-31 NOTE — H&P ADULT - ATTENDING COMMENTS
22yo female with PMHx of pilonidal cyst s/p excision of pilonidal cyst POD#0 with post-operative delirium. Labs sent and reviewed - electrolytes WNL. Neurology consult. Pain control. Avoid narcotics or other delirium-inducing medications. Admit for observation and hydration.

## 2022-04-01 DIAGNOSIS — R41.0 DISORIENTATION, UNSPECIFIED: ICD-10-CM

## 2022-04-01 LAB
ANION GAP SERPL CALC-SCNC: 13 MMOL/L — SIGNIFICANT CHANGE UP (ref 7–14)
BASOPHILS # BLD AUTO: 0.04 K/UL — SIGNIFICANT CHANGE UP (ref 0–0.2)
BASOPHILS NFR BLD AUTO: 0.5 % — SIGNIFICANT CHANGE UP (ref 0–1)
BUN SERPL-MCNC: 15 MG/DL — SIGNIFICANT CHANGE UP (ref 10–20)
CALCIUM SERPL-MCNC: 8.7 MG/DL — SIGNIFICANT CHANGE UP (ref 8.5–10.1)
CHLORIDE SERPL-SCNC: 108 MMOL/L — SIGNIFICANT CHANGE UP (ref 98–110)
CO2 SERPL-SCNC: 21 MMOL/L — SIGNIFICANT CHANGE UP (ref 17–32)
CREAT SERPL-MCNC: 0.8 MG/DL — SIGNIFICANT CHANGE UP (ref 0.7–1.5)
EGFR: 108 ML/MIN/1.73M2 — SIGNIFICANT CHANGE UP
EOSINOPHIL # BLD AUTO: 0.05 K/UL — SIGNIFICANT CHANGE UP (ref 0–0.7)
EOSINOPHIL NFR BLD AUTO: 0.6 % — SIGNIFICANT CHANGE UP (ref 0–8)
GLUCOSE SERPL-MCNC: 90 MG/DL — SIGNIFICANT CHANGE UP (ref 70–99)
HCT VFR BLD CALC: 36.2 % — LOW (ref 37–47)
HGB BLD-MCNC: 11.8 G/DL — LOW (ref 12–16)
IMM GRANULOCYTES NFR BLD AUTO: 0.4 % — HIGH (ref 0.1–0.3)
LYMPHOCYTES # BLD AUTO: 3.48 K/UL — HIGH (ref 1.2–3.4)
LYMPHOCYTES # BLD AUTO: 41 % — SIGNIFICANT CHANGE UP (ref 20.5–51.1)
MAGNESIUM SERPL-MCNC: 2.1 MG/DL — SIGNIFICANT CHANGE UP (ref 1.8–2.4)
MCHC RBC-ENTMCNC: 27.7 PG — SIGNIFICANT CHANGE UP (ref 27–31)
MCHC RBC-ENTMCNC: 32.6 G/DL — SIGNIFICANT CHANGE UP (ref 32–37)
MCV RBC AUTO: 85 FL — SIGNIFICANT CHANGE UP (ref 81–99)
MONOCYTES # BLD AUTO: 0.78 K/UL — HIGH (ref 0.1–0.6)
MONOCYTES NFR BLD AUTO: 9.2 % — SIGNIFICANT CHANGE UP (ref 1.7–9.3)
NEUTROPHILS # BLD AUTO: 4.11 K/UL — SIGNIFICANT CHANGE UP (ref 1.4–6.5)
NEUTROPHILS NFR BLD AUTO: 48.3 % — SIGNIFICANT CHANGE UP (ref 42.2–75.2)
NRBC # BLD: 0 /100 WBCS — SIGNIFICANT CHANGE UP (ref 0–0)
PHOSPHATE SERPL-MCNC: 4.3 MG/DL — SIGNIFICANT CHANGE UP (ref 2.1–4.9)
PLATELET # BLD AUTO: 306 K/UL — SIGNIFICANT CHANGE UP (ref 130–400)
POTASSIUM SERPL-MCNC: 3.9 MMOL/L — SIGNIFICANT CHANGE UP (ref 3.5–5)
POTASSIUM SERPL-SCNC: 3.9 MMOL/L — SIGNIFICANT CHANGE UP (ref 3.5–5)
RBC # BLD: 4.26 M/UL — SIGNIFICANT CHANGE UP (ref 4.2–5.4)
RBC # FLD: 13.3 % — SIGNIFICANT CHANGE UP (ref 11.5–14.5)
SODIUM SERPL-SCNC: 142 MMOL/L — SIGNIFICANT CHANGE UP (ref 135–146)
WBC # BLD: 8.49 K/UL — SIGNIFICANT CHANGE UP (ref 4.8–10.8)
WBC # FLD AUTO: 8.49 K/UL — SIGNIFICANT CHANGE UP (ref 4.8–10.8)

## 2022-04-01 PROCEDURE — 99222 1ST HOSP IP/OBS MODERATE 55: CPT

## 2022-04-01 PROCEDURE — 70450 CT HEAD/BRAIN W/O DYE: CPT | Mod: 26

## 2022-04-01 PROCEDURE — 90792 PSYCH DIAG EVAL W/MED SRVCS: CPT

## 2022-04-01 PROCEDURE — 95819 EEG AWAKE AND ASLEEP: CPT | Mod: 26

## 2022-04-01 RX ORDER — ACETAMINOPHEN 500 MG
650 TABLET ORAL EVERY 6 HOURS
Refills: 0 | Status: DISCONTINUED | OUTPATIENT
Start: 2022-04-01 | End: 2022-04-02

## 2022-04-01 RX ORDER — IBUPROFEN 200 MG
400 TABLET ORAL EVERY 8 HOURS
Refills: 0 | Status: DISCONTINUED | OUTPATIENT
Start: 2022-04-01 | End: 2022-04-02

## 2022-04-01 RX ORDER — PANTOPRAZOLE SODIUM 20 MG/1
40 TABLET, DELAYED RELEASE ORAL
Refills: 0 | Status: DISCONTINUED | OUTPATIENT
Start: 2022-04-01 | End: 2022-04-02

## 2022-04-01 RX ADMIN — Medication 400 MILLIGRAM(S): at 21:04

## 2022-04-01 RX ADMIN — Medication 650 MILLIGRAM(S): at 09:24

## 2022-04-01 RX ADMIN — Medication 650 MILLIGRAM(S): at 17:50

## 2022-04-01 RX ADMIN — Medication 650 MILLIGRAM(S): at 23:26

## 2022-04-01 RX ADMIN — Medication 400 MILLIGRAM(S): at 13:39

## 2022-04-01 RX ADMIN — ENOXAPARIN SODIUM 40 MILLIGRAM(S): 100 INJECTION SUBCUTANEOUS at 23:26

## 2022-04-01 RX ADMIN — PANTOPRAZOLE SODIUM 40 MILLIGRAM(S): 20 TABLET, DELAYED RELEASE ORAL at 09:24

## 2022-04-01 NOTE — BEHAVIORAL HEALTH ASSESSMENT NOTE - NSBHADMITCOUNSEL_PSY_A_CORE
diagnostic results/impressions and/or recommended studies/risks and benefits of treatment options/importance of adherence to chosen treatment/risk factor reduction/client/family/caregiver education

## 2022-04-01 NOTE — BEHAVIORAL HEALTH ASSESSMENT NOTE - HPI (INCLUDE ILLNESS QUALITY, SEVERITY, DURATION, TIMING, CONTEXT, MODIFYING FACTORS, ASSOCIATED SIGNS AND SYMPTOMS)
Pt is a 23 yo F, a college student, with no prior psych history, no JENNIFER, who had a surgical procedure Pt is a 21 yo F, a college student, with no prior psych history, no JENNIFER, who had a surgical procedure. She was evaluated by surgical team this AM. As per surgery resident "Pt is lethargic, alert and oriented to person and place, but not date or time. When asked why she is in the hospital she reports "because they're keeping me." She appears internally preoccupied, looked at the wall, staring with detached blunted affect, tearful at times. She reports not feeling confused, but hearing voices telling her to leave the hospital, and that she not safe here." Psych eval was requested. Pt provided information. I also spoke to her dad and a family friend, who is RN with pt's permission (they both at pt's bedside)for collateral information. Pt is fully oriented at this time. She is not aware of her previous deficits. She says that she is doing much better. Denies feeling depressed. Adamantly denies SI/HI/AH/PI.  Says that she sleeps all the time after the surgery (pt is not sedated at this time). Says that she wants to go home. Her medical situation and possible Dx of delirium were d/w her, including the risks. Pt agreed to cooperate with the medical/surgical team and to stay in hospital for further observation and treatment. Pt is logical and processes information well. As per collateral information, pt had fluctuation of symptoms and cognition this AM and appeared to be confused and psychotic at some moments and doing well at others.

## 2022-04-01 NOTE — BEHAVIORAL HEALTH ASSESSMENT NOTE - SUMMARY
pt with no h/o psych problems or JENNIFER had AMS after surgery, with fluctuation of cognitive deficits and symptoms (confusion, AH/VH, lethargy). Pt is fully oriented, logical at this time. Denies SI/HI/AH/PI. Pt wanted to leave, but agreed to stay after our session.

## 2022-04-01 NOTE — CONSULT NOTE ADULT - ATTENDING COMMENTS
Patient seen and examined and agree with above except as noted.  Patients history, notes, labs, imaging, vitals and meds reviewed personally.  Patient appeared paranoid and responding to internal stimuli.  She became hesitant to have me examine her and begins to cry  REEG done STAT and normal  Possibly acute psychosis post procedure (possibly medication related)    Plan  1. If does not return back to baseline over next 12-24 hours would send for MRI brain w/w/o TAVIA  2. Send TSH, B12, urine toxicology

## 2022-04-01 NOTE — BEHAVIORAL HEALTH ASSESSMENT NOTE - PERCEPTIONS
Patient requesting xanax to be sent to [facility] [address]  Previous [facility] [address] and they cannot transfer the partial rx refills.    Please send new rx to correct pharmacy [facility] [address]    No abnormalities

## 2022-04-01 NOTE — PROGRESS NOTE ADULT - ATTENDING COMMENTS
22yo female with PMHx of pilonidal cyst s/p excision of pilonidal cyst POD#1 with post-operative delirium. Patient is complaining of hearing echos. On exam, patient is still confused and speaking with infantile affect. Wound is healing well. Patient is tolerating diet. Labs sent and reviewed - electrolytes WNL. Neurology consulted - recommending EEG. Pain control. Avoid narcotics or other delirium-inducing medications. CT head. Will call psych and anesthesia. Continue to observe.

## 2022-04-01 NOTE — CONSULT NOTE ADULT - ASSESSMENT
On evaluation, patient appears confused, internally preoccupied, with nursing reports that indicate the patient at times is more alert and talkative that others. Concern for seizure vs delirium. Neurology recommends    - Stat EEG      **full attending recommendations to follow** On evaluation, patient appears confused, internally preoccupied, with nursing reports that indicate the patient at times is more alert and talkative that others. Concern for seizure vs delirium.     - Stat EEG didn't reveal any abnormalities.  - Most likely anesthesia induced psychosis, if doesn't improve in 24 hours please obtain MRI of brain w/wo TAVIA  - COnsider Psychiatry consult.

## 2022-04-01 NOTE — PROGRESS NOTE ADULT - ASSESSMENT
A/P: 22 y/o female  s/p pilonidal cyst removal POD#1 admitted for AMS   Pt is stable,  alert and oriented x 3   continue current management   neurology consult  continue close observation A/P: 22 y/o female  s/p pilonidal cyst removal POD#1 admitted for AMS   Pt is stable, feeling better  alert and oriented x 3   continue current management   neurology consult  continue close observation

## 2022-04-01 NOTE — CONSULT NOTE ADULT - SUBJECTIVE AND OBJECTIVE BOX
Neurology Consult    Patient is a 21y old  Female who presents with a chief complaint of pilonidal cyst (01 Apr 2022 09:23)    Patient seen and evaluated. Mother at bedside provides the majority of history. On approach, patient is seen sleeping, with notable rhythmic consistent twitch of left eyebrow noted. She is lethargic, alert and oriented to person and place, but not date or time. When asked why she is in the hospital she reports "because they're keeping me." She appears internally preoccupied, looked at the wall, staring with detached blunted affect, tearful at times. She reports not feeling confused, but hearing voices telling her to leave the hospital, and that she not safe here. She becomes very tearful when physical exam is attempted, with mother reporting she has been fearful of men since her procedure and "is not like this."    Per mother, patient has been at functional baseline prior to procedure; she attends college, performs well in classes. She denies history of seizure, mental illness in the family or patient. She does not believe the patient has had any traumatic experiences with men or ever used substances.       HPI:  22 y/o female w PMH of pilonidal cyst s/p pilonidal cyst removal today that is currently presenting with AMS in the PACU. Pt evaluated at bedside with mother who reports patient has been confused and delirious post operative. As per the CRNA the patient experienced an episode of alteration with SOB, hyperventilation and tachycardia that resolved after administering Precedex. Afterwards, the patient was seen sleeping in the PACU and when she woke up was scared and confused about her surroundings. The mother denies any previous similar episodes, but notes that this is note her baseline.  (31 Mar 2022 19:32)      PAST MEDICAL & SURGICAL HISTORY:  GERD (gastroesophageal reflux disease)    Pilonidal cyst    S/P surgical removal of pilonidal cyst        FAMILY HISTORY:      Social History: (-) x 3    Allergies    No Known Allergies    Intolerances        MEDICATIONS  (STANDING):  acetaminophen     Tablet .. 650 milliGRAM(s) Oral every 6 hours  chlorhexidine 4% Liquid 1 Application(s) Topical <User Schedule>  enoxaparin Injectable 40 milliGRAM(s) SubCutaneous every 24 hours  ibuprofen  Tablet. 400 milliGRAM(s) Oral every 8 hours  pantoprazole    Tablet 40 milliGRAM(s) Oral before breakfast    MEDICATIONS  (PRN):      Review of systems:    Constitutional: as per HPI  Eyes: No eye pain or discharge  ENMT:  No difficulty hearing; No sinus or throat pain  Neck: No pain or stiffness  Respiratory: No cough, wheezing, chills or hemoptysis  Cardiovascular: No chest pain, palpitations, shortness of breath, dyspnea on exertion  Gastrointestinal: No abdominal pain, nausea, vomiting or hematemesis; No diarrhea or constipation.   Genitourinary: No dysuria, frequency, hematuria or incontinence  Neurological: As per HPI  Skin: No rashes or lesions   Endocrine: No heat or cold intolerance; No hair loss  Musculoskeletal: No joint pain or swelling  Psychiatric: No depression, anxiety, mood swings  Heme/Lymph: No easy bruising or bleeding gums    Vital Signs Last 24 Hrs  T(C): 35.9 (01 Apr 2022 08:19), Max: 37.1 (31 Mar 2022 11:45)  T(F): 96.7 (01 Apr 2022 08:19), Max: 98.8 (31 Mar 2022 11:45)  HR: 82 (01 Apr 2022 08:19) (57 - 82)  BP: 109/56 (01 Apr 2022 08:19) (98/56 - 138/63)  BP(mean): --  RR: 17 (01 Apr 2022 08:19) (12 - 57)  SpO2: 99% (01 Apr 2022 00:31) (96% - 100%)    Examination:  General:  Appearance is consistent with chronologic age.  No abnormal facies.  Gross skin survey within normal limits.    Cognitive/Language:  The patient is oriented to person and place, not time, date, or situation at time of exam.  Recent and remote memory intact.  Fund of knowledge is intact and normal.  Language abnormal, with limited interaction, unable to complete full examination of language secondary to mental status.  Nondysarthric.    Eyes: intact VA, VFF.  EOMI w/o nystagmus, skew or reported double vision.  PERRL.  No ptosis/weakness of eyelid closure. brow twitching noticed above left eye  Face:  Facial sensation normal V1 - 3, no facial asymmetry.    Ears/Nose/Throat:  Hearing grossly intact b/l.  Palate elevates midline.  Tongue and uvula midline.   Motor examination:   Normal tone, bulk and range of motion.  No tenderness, twitching, tremors or involuntary movements.  Formal Muscle Strength Testing: (MRC grade R/L) 5/5 UE; 5/5 LE.  No observable drift.  Reflexes:   2+ b/l pectoralis, biceps, triceps, brachioradialis, patella and Achilles.  Plantar response downgoing b/l.  Jaw jerk, Charlie, clonus absent.  Sensory examination:   Intact to light touch and pinprick, pain, temperature and proprioception and vibration in all extremities.  Cerebellum:   FTN/HKS not assessed secondary to mental status. Gait not assessed    Respiratory:  no audible wheezing or inspiratory stridor.  no use of accessory muscles.   Cardiac: pulse palpable, no audible bruits  Abdomen: supple, no guarding, no TTP    Labs:   CBC Full  -  ( 31 Mar 2022 20:17 )  WBC Count : 8.10 K/uL  RBC Count : 4.76 M/uL  Hemoglobin : 13.4 g/dL  Hematocrit : 39.9 %  Platelet Count - Automated : 321 K/uL  Mean Cell Volume : 83.8 fL  Mean Cell Hemoglobin : 28.2 pg  Mean Cell Hemoglobin Concentration : 33.6 g/dL  Auto Neutrophil # : 6.95 K/uL  Auto Lymphocyte # : 0.95 K/uL  Auto Monocyte # : 0.14 K/uL  Auto Eosinophil # : 0.00 K/uL  Auto Basophil # : 0.01 K/uL  Auto Neutrophil % : 85.9 %  Auto Lymphocyte % : 11.7 %  Auto Monocyte % : 1.7 %  Auto Eosinophil % : 0.0 %  Auto Basophil % : 0.1 %    03-31    141  |  106  |  9<L>  ----------------------------<  110<H>  4.2   |  21  |  0.6<L>    Ca    9.4      31 Mar 2022 20:17  Phos  3.4     03-31  Mg     1.8     03-31                Neuroimaging:  [facility]:     04-01-22 @ 11:29       Neurology Consult    Patient is a 21y old  Female who presents with a chief complaint of pilonidal cyst (01 Apr 2022 09:23)    Patient seen and evaluated. Mother at bedside provides the majority of history. On approach, patient is seen sleeping, with notable rhythmic consistent twitch of left eyebrow noted. She is lethargic, alert and oriented to person and place, but not date or time. When asked why she is in the hospital she reports "because they're keeping me." She appears internally preoccupied, looked at the wall, staring with detached blunted affect, tearful at times. She reports not feeling confused, but hearing voices telling her to leave the hospital, and that she not safe here. She becomes very tearful when physical exam is attempted, with mother reporting she has been fearful of men since her procedure and "is not like this."    Per mother, patient has been at functional baseline prior to procedure; she attends college, performs well in classes. She denies history of seizure, mental illness in the family or patient. She does not believe the patient has had any traumatic experiences with men or ever used substances.       HPI:  20 y/o female w PMH of pilonidal cyst s/p pilonidal cyst removal today that is currently presenting with AMS in the PACU. Pt evaluated at bedside with mother who reports patient has been confused and delirious post operative. As per the CRNA the patient experienced an episode of alteration with SOB, hyperventilation and tachycardia that resolved after administering Precedex. Afterwards, the patient was seen sleeping in the PACU and when she woke up was scared and confused about her surroundings. The mother denies any previous similar episodes, but notes that this is note her baseline.  (31 Mar 2022 19:32)      PAST MEDICAL & SURGICAL HISTORY:  GERD (gastroesophageal reflux disease)    Pilonidal cyst    S/P surgical removal of pilonidal cyst        FAMILY HISTORY:      Social History: (-) x 3    Allergies    No Known Allergies    Intolerances        MEDICATIONS  (STANDING):  acetaminophen     Tablet .. 650 milliGRAM(s) Oral every 6 hours  chlorhexidine 4% Liquid 1 Application(s) Topical <User Schedule>  enoxaparin Injectable 40 milliGRAM(s) SubCutaneous every 24 hours  ibuprofen  Tablet. 400 milliGRAM(s) Oral every 8 hours  pantoprazole    Tablet 40 milliGRAM(s) Oral before breakfast    MEDICATIONS  (PRN):      Review of systems:    Constitutional: as per HPI  Eyes: No eye pain or discharge  ENMT:  No difficulty hearing; No sinus or throat pain  Neck: No pain or stiffness  Respiratory: No cough, wheezing, chills or hemoptysis  Cardiovascular: No chest pain, palpitations, shortness of breath, dyspnea on exertion  Gastrointestinal: No abdominal pain, nausea, vomiting or hematemesis; No diarrhea or constipation.   Genitourinary: No dysuria, frequency, hematuria or incontinence  Neurological: As per HPI  Skin: No rashes or lesions   Endocrine: No heat or cold intolerance; No hair loss  Musculoskeletal: No joint pain or swelling  Psychiatric: No depression, anxiety, mood swings  Heme/Lymph: No easy bruising or bleeding gums    Vital Signs Last 24 Hrs  T(C): 35.9 (01 Apr 2022 08:19), Max: 37.1 (31 Mar 2022 11:45)  T(F): 96.7 (01 Apr 2022 08:19), Max: 98.8 (31 Mar 2022 11:45)  HR: 82 (01 Apr 2022 08:19) (57 - 82)  BP: 109/56 (01 Apr 2022 08:19) (98/56 - 138/63)  BP(mean): --  RR: 17 (01 Apr 2022 08:19) (12 - 57)  SpO2: 99% (01 Apr 2022 00:31) (96% - 100%)    Examination:  General:  Appearance is consistent with chronologic age.  No abnormal facies.  Gross skin survey within normal limits.    Cognitive/Language:  The patient is oriented to person and place, not time, date, or situation at time of exam.  Recent and remote memory intact.  Fund of knowledge is intact and normal.  Language abnormal, with limited interaction, unable to complete full examination of language secondary to mental status.  Nondysarthric.    Eyes: intact VA, VFF.  EOMI w/o nystagmus, skew or reported double vision.  PERRL.  No ptosis/weakness of eyelid closure. brow twitching noticed above left eye  Face:  Facial sensation normal V1 - 3, no facial asymmetry.    Ears/Nose/Throat:  Hearing grossly intact b/l.  Palate elevates midline.  Tongue and uvula midline.   Motor examination:   Normal tone, bulk and range of motion.  No tenderness, twitching, tremors or involuntary movements.  Formal Muscle Strength Testing: (MRC grade R/L) 5/5 UE; 5/5 LE.  No observable drift.  Reflexes:   2+ b/l pectoralis, biceps, triceps, brachioradialis, patella and Achilles.  Plantar response downgoing b/l.  Jaw jerk, Charlie, clonus absent.  Sensory examination:   Intact to light touch and pinprick, pain, temperature and proprioception and vibration in all extremities.  Cerebellum:   FTN/HKS not assessed secondary to mental status. Gait not assessed    Respiratory:  no audible wheezing or inspiratory stridor.  no use of accessory muscles.   Cardiac: pulse palpable, no audible bruits  Abdomen: supple, no guarding, no TTP    Labs:   CBC Full  -  ( 31 Mar 2022 20:17 )  WBC Count : 8.10 K/uL  RBC Count : 4.76 M/uL  Hemoglobin : 13.4 g/dL  Hematocrit : 39.9 %  Platelet Count - Automated : 321 K/uL  Mean Cell Volume : 83.8 fL  Mean Cell Hemoglobin : 28.2 pg  Mean Cell Hemoglobin Concentration : 33.6 g/dL  Auto Neutrophil # : 6.95 K/uL  Auto Lymphocyte # : 0.95 K/uL  Auto Monocyte # : 0.14 K/uL  Auto Eosinophil # : 0.00 K/uL  Auto Basophil # : 0.01 K/uL  Auto Neutrophil % : 85.9 %  Auto Lymphocyte % : 11.7 %  Auto Monocyte % : 1.7 %  Auto Eosinophil % : 0.0 %  Auto Basophil % : 0.1 %    03-31    141  |  106  |  9<L>  ----------------------------<  110<H>  4.2   |  21  |  0.6<L>    Ca    9.4      31 Mar 2022 20:17  Phos  3.4     03-31  Mg     1.8     03-31                Neuroimaging:  [facility]:     04-01-22 @ 11:29    < from: EEG (04.01.22 @ 11:00) >  Clinical Correlation & Recommendations  A normal EEG does not exclude the possibility of seizure disorder.   Clinical correlation is recommended.

## 2022-04-01 NOTE — BEHAVIORAL HEALTH ASSESSMENT NOTE - SUICIDE PROTECTIVE FACTORS
Responsibility to family and others/Identifies reasons for living/Has future plans/Supportive social network of family or friends/Fear of death or the actual act of killing self/Engaged in work or school

## 2022-04-01 NOTE — PROGRESS NOTE ADULT - SUBJECTIVE AND OBJECTIVE BOX
Procedure: s/p pilonidal cyst excision  POD #1    PAST MEDICAL & SURGICAL HISTORY:  GERD (gastroesophageal reflux disease)    Pilonidal cyst    S/P surgical removal of pilonidal cyst        24H EVENTS    Vital Signs Last 24 Hrs  T(C): 35.9 (2022 08:19), Max: 37.1 (31 Mar 2022 11:45)  T(F): 96.7 (2022 08:19), Max: 98.8 (31 Mar 2022 11:45)  HR: 82 (2022 08:19) (57 - 82)  BP: 109/56 (2022 08:19) (98/56 - 138/63)  BP(mean): --  RR: 17 (2022 08:19) (12 - 57)  SpO2: 99% (2022 00:31) (96% - 100%)        I&O's Detail    31 Mar 2022 07:01  -  2022 07:00  --------------------------------------------------------  IN:    Oral Fluid: 360 mL  Total IN: 360 mL    OUT:  Total OUT: 0 mL    Total NET: 360 mL                 13.4   8.10  )-----------( 321      (  @ 20:17 )             39.9                    141   |  106   |  9                  Ca: 9.4    BMP:   ----------------------------< 110    M.8   (22 @ 20:17)             4.2    |  21    | 0.6                Ph: 3.4          MEDICATIONS  (STANDING):  acetaminophen     Tablet .. 650 milliGRAM(s) Oral every 6 hours  chlorhexidine 4% Liquid 1 Application(s) Topical <User Schedule>  enoxaparin Injectable 40 milliGRAM(s) SubCutaneous every 24 hours  ibuprofen  Tablet. 400 milliGRAM(s) Oral every 8 hours  pantoprazole    Tablet 40 milliGRAM(s) Oral before breakfast    MEDICATIONS  (PRN):      Diet, Regular (22 @ 19:27)      PHYSICAL EXAM:  General Appearance: pt in  NAD  Chest: + air entry bilat  CV: S1, S2,   Abdomen: Soft, NT, ND  back: surgical wound clean   Extremities: + ROM  Neuro: Alert and awake

## 2022-04-01 NOTE — BEHAVIORAL HEALTH ASSESSMENT NOTE - NSBHREFERDETAILS_PSY_A_CORE_FT
AMS after surgical procedure AMS after surgical procedure.  As per surgery resident , who evaluated pt this AM "She is lethargic, alert and oriented to person and place, but not date or time. When asked why she is in the hospital she reports "because they're keeping me." She appears internally preoccupied, looked at the wall, staring with detached blunted affect, tearful at times. She reports not feeling confused, but hearing voices telling her to leave.   the hospital, and that she not safe here.

## 2022-04-02 ENCOUNTER — TRANSCRIPTION ENCOUNTER (OUTPATIENT)
Age: 22
End: 2022-04-02

## 2022-04-02 VITALS
DIASTOLIC BLOOD PRESSURE: 65 MMHG | OXYGEN SATURATION: 97 % | RESPIRATION RATE: 18 BRPM | HEART RATE: 79 BPM | TEMPERATURE: 99 F | SYSTOLIC BLOOD PRESSURE: 104 MMHG

## 2022-04-02 LAB
TSH SERPL-MCNC: 3.72 UIU/ML — SIGNIFICANT CHANGE UP (ref 0.27–4.2)
VIT B12 SERPL-MCNC: 422 PG/ML — SIGNIFICANT CHANGE UP (ref 232–1245)

## 2022-04-02 RX ORDER — SODIUM CHLORIDE 9 MG/ML
500 INJECTION, SOLUTION INTRAVENOUS
Refills: 0 | Status: DISCONTINUED | OUTPATIENT
Start: 2022-04-02 | End: 2022-04-02

## 2022-04-02 RX ADMIN — Medication 400 MILLIGRAM(S): at 05:06

## 2022-04-02 RX ADMIN — SODIUM CHLORIDE 500 MILLILITER(S): 9 INJECTION, SOLUTION INTRAVENOUS at 02:00

## 2022-04-02 RX ADMIN — Medication 650 MILLIGRAM(S): at 11:03

## 2022-04-02 RX ADMIN — Medication 400 MILLIGRAM(S): at 13:29

## 2022-04-02 RX ADMIN — Medication 650 MILLIGRAM(S): at 05:06

## 2022-04-02 RX ADMIN — PANTOPRAZOLE SODIUM 40 MILLIGRAM(S): 20 TABLET, DELAYED RELEASE ORAL at 09:20

## 2022-04-02 RX ADMIN — SODIUM CHLORIDE 500 MILLILITER(S): 9 INJECTION, SOLUTION INTRAVENOUS at 02:30

## 2022-04-02 RX ADMIN — SODIUM CHLORIDE 500 MILLILITER(S): 9 INJECTION, SOLUTION INTRAVENOUS at 01:45

## 2022-04-02 RX ADMIN — SODIUM CHLORIDE 500 MILLILITER(S): 9 INJECTION, SOLUTION INTRAVENOUS at 05:27

## 2022-04-02 RX ADMIN — SODIUM CHLORIDE 500 MILLILITER(S): 9 INJECTION, SOLUTION INTRAVENOUS at 06:00

## 2022-04-02 RX ADMIN — CHLORHEXIDINE GLUCONATE 1 APPLICATION(S): 213 SOLUTION TOPICAL at 05:27

## 2022-04-02 NOTE — DISCHARGE NOTE PROVIDER - NSDCMRMEDTOKEN_GEN_ALL_CORE_FT
ibuprofen 600 mg oral tablet: 1 tab(s) orally every 8 hours   omeprazole:   Tylenol 325 mg oral tablet: 2 tab(s) orally every 6 hours   Vitamin D2:

## 2022-04-02 NOTE — DISCHARGE NOTE NURSING/CASE MANAGEMENT/SOCIAL WORK - NSDCPEFALRISK_GEN_ALL_CORE
For information on Fall & Injury Prevention, visit: [email or web address] OR  [email or web address] OR  https://www.cdc.gov/steadi/patient.html

## 2022-04-02 NOTE — DISCHARGE NOTE PROVIDER - HOSPITAL COURSE
This is a 22y/o female presented to [facility] for elective surgery. pt underwent simple pilonidal cyst excision on 3/31/2022.  Post operatively course complicated by AMS . pt was admitted to surgery service for observation.  pt was evaluated by neurology, and psychiatry .EEG and Ct head are negative. pt treated medically. Her condition improved.   On 4/2/2022 pt without complaints, tolerated po diet, voided and ambulated. vital signs stable and afebrile. alert and oriented x 3 . no focal deficits. pt stable and   discharged home . Pt advised to follow up with Dr Valverde next week and neurology and PMD. precaution provided.

## 2022-04-02 NOTE — CHART NOTE - NSCHARTNOTEFT_GEN_A_CORE
Saw patient at bedside. She is sleepy, but easily arousable. She is Alert and oriented x3, speech clear, has recognized everyone. She doesn't recall anything from yesterday. Spoke to family bedside, patient is back to her baseline, speaking clearly, no more episodes of confusion. Most likely post procedural Delirium. If patient stays at her baseline, no need for further neurological workup at this moment.

## 2022-04-02 NOTE — PROGRESS NOTE ADULT - ASSESSMENT
A/P: 20 y/o female  s/p pilonidal cyst removal POD#1 admitted for AMS   Pt is stable, feeling better  alert and oriented x 3    Plan:   continue current management   monitor vitals and labs  f/u neurology  F/U Anesthesia consult   F/U Psychiatry consult  continue close observation

## 2022-04-02 NOTE — DISCHARGE NOTE NURSING/CASE MANAGEMENT/SOCIAL WORK - PATIENT PORTAL LINK FT
You can access the FollowMyHealth Patient Portal offered by [facility] by registering at the following website: [email or web address]. By joining [facility]’s FollowMyHealth portal, you will also be able to view your health information using other applications (apps) compatible with our system.

## 2022-04-02 NOTE — DISCHARGE NOTE PROVIDER - CARE PROVIDER_API CALL
Roro Valverde)  [facility]  [address] [place] [address], [address] [place] [address]  Phone: [phone]  Fax: [phone]  Follow Up Time:

## 2022-04-02 NOTE — PROGRESS NOTE ADULT - SUBJECTIVE AND OBJECTIVE BOX
GENERAL SURGERY PROGRESS NOTE    Patient: MACARENA HAHN , 21y ([birth year])Female   MRN: [id number]  Location: [address]  Visit: 03-31-22 Inpatient  Date: 04-02-22 @ 01:45    Hospital Day #: 3  Post-Op Day #: 2    Procedure/Dx/Injuries: S/P Elective pilonidal cyst excision    Events of past 24 hours:   BP 93/53 ->  cc bolus given    PAST MEDICAL & SURGICAL HISTORY:  GERD (gastroesophageal reflux disease)    Pilonidal cyst    S/P surgical removal of pilonidal cyst    Vitals:   T(F): 96.7 (04-02-22 @ 00:20), Max: 99.3 (04-01-22 @ 12:24)  HR: 74 (04-02-22 @ 00:20)  BP: 93/53 (04-02-22 @ 00:20)  RR: 18 (04-02-22 @ 00:20)  SpO2: 99% (04-02-22 @ 00:20)      Diet, Regular      Fluids: lactated ringers.: Solution, 500 milliLiter(s) infuse at 500 mL/Hr      I & O's:    03-31-22 @ 07:01  -  04-01-22 @ 07:00  --------------------------------------------------------  IN:    Oral Fluid: 360 mL  Total IN: 360 mL    OUT:  Total OUT: 0 mL    Total NET: 360 mL    PHYSICAL EXAM:  General Appearance: pt in  NAD  Chest: + air entry bilat  CV: S1, S2,   Abdomen: Soft, NT, ND  back: surgical wound clean   Extremities: + ROM  Neuro: Alert and awake    MEDICATIONS  (STANDING):  acetaminophen     Tablet .. 650 milliGRAM(s) Oral every 6 hours  chlorhexidine 4% Liquid 1 Application(s) Topical <User Schedule>  enoxaparin Injectable 40 milliGRAM(s) SubCutaneous every 24 hours  ibuprofen  Tablet. 400 milliGRAM(s) Oral every 8 hours  lactated ringers. 500 milliLiter(s) (500 mL/Hr) IV Continuous <Continuous>  pantoprazole    Tablet 40 milliGRAM(s) Oral before breakfast    MEDICATIONS  (PRN):      DVT PROPHYLAXIS: enoxaparin Injectable 40 milliGRAM(s) SubCutaneous every 24 hours    GI PROPHYLAXIS: pantoprazole    Tablet 40 milliGRAM(s) Oral before breakfast    ANTICOAGULATION:   ANTIBIOTICS:            LAB/STUDIES:  Labs:  CAPILLARY BLOOD GLUCOSE                              11.8   8.49  )-----------( 306      ( 01 Apr 2022 20:00 )             36.2       Auto Neutrophil %: 48.3 % (04-01-22 @ 20:00)  Auto Immature Granulocyte %: 0.4 % (04-01-22 @ 20:00)    04-01    142  |  108  |  15  ----------------------------<  90  3.9   |  21  |  0.8      Calcium, Total Serum: 8.7 mg/dL (04-01-22 @ 20:00)

## 2022-04-02 NOTE — DISCHARGE NOTE PROVIDER - NSDCFUADDINST_GEN_ALL_CORE_FT
Follow up with Dr Valverde next week call office for appointment   follow up with neurology  next week call office for appointment [phone]   follow up with PMD    no strenuous activity  keep wound clean and dry    if experience fever, chest pain, shortness of breath, dizziness, vomiting, bleeding or drainage from wound call MD or return ot ED

## 2022-04-04 LAB — SURGICAL PATHOLOGY STUDY: SIGNIFICANT CHANGE UP

## 2022-04-08 ENCOUNTER — APPOINTMENT (OUTPATIENT)
Dept: SURGERY | Facility: CLINIC | Age: 22
End: 2022-04-08
Payer: MEDICAID

## 2022-04-08 VITALS
HEART RATE: 81 BPM | HEIGHT: 59 IN | SYSTOLIC BLOOD PRESSURE: 118 MMHG | OXYGEN SATURATION: 98 % | WEIGHT: 135 LBS | BODY MASS INDEX: 27.21 KG/M2 | TEMPERATURE: 97 F | DIASTOLIC BLOOD PRESSURE: 80 MMHG

## 2022-04-08 PROBLEM — K21.9 GASTRO-ESOPHAGEAL REFLUX DISEASE WITHOUT ESOPHAGITIS: Chronic | Status: ACTIVE | Noted: 2022-03-31

## 2022-04-08 PROBLEM — L05.91 PILONIDAL CYST WITHOUT ABSCESS: Chronic | Status: ACTIVE | Noted: 2022-03-31

## 2022-04-08 PROCEDURE — 99024 POSTOP FOLLOW-UP VISIT: CPT

## 2022-04-10 ENCOUNTER — EMERGENCY (EMERGENCY)
Facility: HOSPITAL | Age: 22
LOS: 0 days | Discharge: HOME | End: 2022-04-10
Attending: PEDIATRICS | Admitting: PEDIATRICS
Payer: MEDICAID

## 2022-04-10 VITALS
RESPIRATION RATE: 20 BRPM | SYSTOLIC BLOOD PRESSURE: 137 MMHG | WEIGHT: 156.53 LBS | OXYGEN SATURATION: 99 % | DIASTOLIC BLOOD PRESSURE: 66 MMHG | HEIGHT: 60 IN | TEMPERATURE: 98 F | HEART RATE: 111 BPM

## 2022-04-10 DIAGNOSIS — Z98.890 OTHER SPECIFIED POSTPROCEDURAL STATES: Chronic | ICD-10-CM

## 2022-04-10 DIAGNOSIS — K21.9 GASTRO-ESOPHAGEAL REFLUX DISEASE WITHOUT ESOPHAGITIS: ICD-10-CM
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
## 2022-04-10 DIAGNOSIS — S31.809A UNSPECIFIED OPEN WOUND OF UNSPECIFIED BUTTOCK, INITIAL ENCOUNTER: ICD-10-CM

## 2022-04-10 DIAGNOSIS — T81.89XA OTHER COMPLICATIONS OF PROCEDURES, NOT ELSEWHERE CLASSIFIED, INITIAL ENCOUNTER: ICD-10-CM

## 2022-04-10 PROCEDURE — 99284 EMERGENCY DEPT VISIT MOD MDM: CPT

## 2022-04-10 PROCEDURE — 99024 POSTOP FOLLOW-UP VISIT: CPT

## 2022-04-10 NOTE — ED ADULT TRIAGE NOTE - CHIEF COMPLAINT QUOTE
So I had surgery on March 31, I had a cyst removed from my back, I went on Friday  to the doctor, it was bleeding and pus coming out, he said it was okay, now its opened - patient

## 2022-04-10 NOTE — CONSULT NOTE ADULT - ASSESSMENT
ASSESSMENT:  21yF w/ PMHx of pilonidal cyst, s/p pilonidal cyst excision 3/31 with Dr. Valverde who presented with concern for wound dehiscence. Wound examined, cleaned with saline and covered with gauze. No evidence of cellulitis or developing abscess.     Physical exam findings, imaging, and labs as documented above.     PLAN:  -Continue with current management- no need for antibiotics or any further intervention at this time.  -Can keep area covered with 4X4, change with bowel movements and after showers. No need to flush or wash with saline.  -Keep current appointment with Dr. Valverde for Friday 4/17    Lines/Tubes: PIV,     Above plan discussed with Attending Surgeon Dr. Valverde  , patient, patient family, and Primary team  04-10-22 @ 23:50

## 2022-04-10 NOTE — ED PROVIDER NOTE - ATTENDING CONTRIBUTION TO CARE
I personally evaluated the patient. I reviewed the Resident’s note (as assigned above), and agree with the findings and plan except as documented in my note.  21-year-old here for evaluation after noted that surgical site for pilonidal cyst was removed now appears of open mom was worried because she felt she saw purulent drainage on the child's clothes no pain no fever just noted that sutures seem to have opened does have follow-up appointment this week with surgeon PE reveals very clean surgical site approximately 1 to 2 inches long with healing tissue noted rest of PE  is normal reassurance given

## 2022-04-10 NOTE — ED PROVIDER NOTE - NS ED ROS FT
Constitutional: No fever.  Eyes:  No visual changes, eye pain or discharge.  ENMT:  No hearing changes, pain, no sore throat or runny nose, no difficulty swallowing  Cardiac:  No chest pain, SOB or edema. No chest pain with exertion.  Respiratory:  No cough or respiratory distress.  GI:  No nausea, vomiting, diarrhea or abdominal pain.  :  No dysuria, frequency or burning.  MS:  No myalgia, muscle weakness, joint pain or back pain.  Neuro:  No headache or weakness.  No LOC.  Skin: +wound. No skin rash.

## 2022-04-10 NOTE — ED PROVIDER NOTE - PATIENT PORTAL LINK FT
You can access the FollowMyHealth Patient Portal offered by [facility] by registering at the following website: [email or web address]. By joining [facility]’s FollowMyHealth portal, you will also be able to view your health information using other applications (apps) compatible with our system.

## 2022-04-10 NOTE — CONSULT NOTE ADULT - SUBJECTIVE AND OBJECTIVE BOX
GENERAL SURGERY CONSULT NOTE    Patient: MACARENA HAHN , 21y ([birth year])Female   MRN: [id number]  Location: [facility] ED  Visit: 04-10-22 Emergency  Date: 04-10-22 @ 23:50    HPI: 21F presents to ED for a wound check s/p pilonidal cyst excision performed by Dr. Valverde on 3/31, the patient was seen in the office on Friday 4/8/22 for a wound check and cleared to follow up again next week. The patient returns today with her mother to have wound examined after she experienced pain and some white-tinged discharge after a shower. The wound that once closed on Friday is now slightly dehisced. The patient states there is only a very small amount of discharge as previously described, there is no foul odor and there is only a "pinching" like pain. The wound was examined at bedside, flushed with some saline and covered with a 4x4. The patient will be discharged to home with instructions to continue current management and follow up with Dr. Valverde as previously instructed.       PAST MEDICAL & SURGICAL HISTORY:  GERD (gastroesophageal reflux disease)    Pilonidal cyst    S/P surgical removal of pilonidal cyst        Home Medications:  omeprazole:  (31 Mar 2022 11:53)  Vitamin D2:  (31 Mar 2022 11:53)        VITALS:  T(F): 98 (04-10-22 @ 21:27), Max: 98 (04-10-22 @ 21:27)  HR: 111 (04-10-22 @ 21:27) (111 - 111)  BP: 137/66 (04-10-22 @ 21:27) (137/66 - 137/66)  RR: 20 (04-10-22 @ 21:27) (20 - 20)  SpO2: 99% (04-10-22 @ 21:27) (99% - 99%)    PHYSICAL EXAM:  General: NAD, AAOx3, calm and cooperative  HEENT: NCAT, LIVIER, EOMI, Trachea ML, Neck supple  Respiratory: Equal chest rise bilaterally  Rectal: Pilonidal wound examined, incision 1-2cm in length with a small amount of mucinous discharge, not malodorous, No evidence of cellulitis or collection noted. Wound is slightly dehisced compared to prior photos. No active drainage or bleeding. Wound flushed with saline and covered with gauze.      MEDICATIONS  (STANDING):    MEDICATIONS  (PRN):      LAB/STUDIES:  N/A    IMAGING:  N/A    ACCESS DEVICES:  [X ] Peripheral IV

## 2022-04-10 NOTE — ED PROVIDER NOTE - OBJECTIVE STATEMENT
22 y/o F with no PMH s/p pilonidal cyst removal 3/31/22 by Dr. Valverde c/o opening of surgical site. Pt last seen by surgery PA on 4/8 and was told healing progressing normally; this morning felt that wound completely opened and was draining bloody fluid. Denies fever. 20 y/o F with no PMH s/p pilonidal cyst removal 3/31/22 by Dr. Valverde c/o opening of surgical site. Pt last seen by surgery PA on 4/8 and was told healing progressing normally; this morning felt that wound completely opened and was draining bloody fluid. Denies fever. Scheduled to see surgery PA on 4/15

## 2022-04-10 NOTE — ED PEDIATRIC NURSE NOTE - CAS DISCH TRANSFER METHOD
Private car
Quality 130: Documentation Of Current Medications In The Medical Record: Current Medications Documented
Quality 110: Preventive Care And Screening: Influenza Immunization: Influenza Immunization Administered during Influenza season
Quality 226: Preventive Care And Screening: Tobacco Use: Screening And Cessation Intervention: Tobacco Screening not Performed for Medical Reasons
Detail Level: Detailed
Quality 111:Pneumonia Vaccination Status For Older Adults: Pneumococcal Vaccination Previously Received

## 2022-04-10 NOTE — ED PROVIDER NOTE - PHYSICAL EXAMINATION
CONSTITUTIONAL: Well-developed; well-nourished; in no acute distress.   SKIN: Warm, dry. Open linear wound to gluteal cleft, +granulation tissue; no erythema, purulent discharge, warmth.   HEAD: Normocephalic; atraumatic  EYES: PERRL, EOMI, normal sclera and conjunctiva   ENT: No nasal discharge; airway clear.  NECK: Supple; non tender.  CARD:  Regular rate and rhythm. Normal S1, S2  RESP: No increased WOB. CTA b/l without wheezes, crackles, rhonchi  ABD: Normoactive BS. Soft, nontender, nondistended.  EXT: Normal ROM.   LYMPH: No acute cervical adenopathy.  NEURO: Alert, oriented, grossly unremarkable  PSYCH: Cooperative, appropriate.

## 2022-04-11 NOTE — CDI QUERY NOTE - NSCDIOTHERTXTBX_GEN_ALL_CORE_HH
22 y/o female w PMH of pilonidal cyst s/p pilonidal cyst removal today that is currently presenting with AMS in the PACU. Pt evaluated at bedside with mother who reports patient has been confused and delirious post operative.   As per the CRNA the patient experienced an episode of alteration with SOB, hyperventilation and tachycardia that resolved after administering Precedex. Afterwards, the patient was seen sleeping in the PACU and when she woke up was scared and confused about her surroundings.    … POD#0 with post-operative delirium. Labs sent and reviewed - electrolytes WNL. Neurology consult. Pain control. Avoid narcotics or other delirium-inducing medications. Admit for observation and hydration.     04/ 02  A/P: 22 y/o female  s/p pilonidal cyst removal POD#1 admitted for AMS. 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 If does not return back to baseline over next 12-24 hours would send for MRI brain w/w/o TAVIA  2. Send TSH, B12, urine toxicology.    Imaging: CT BRAIN                        PROCEDURE DATE:  04/01/2022    INTERPRETATION:  Clinical History / Reason for exam: Altered mental status.  IMPRESSION: No CT evidence of acute intracranial pathology.      QUERY  Based on your clinical judgment and consideration of these clinical indicators, could you please further clarify presumed/ probable/ suspected etiology of AMS and delirium can be further specified as:  -	Suspected Toxic encephalopathy due to anesthesia or drugs.  -	Suspected Encephalopathy due to drugs.  -	Delirium due to _____ (please specify diagnosis).  -	Other (please specify).  -	Unable to determine.      THANK YOU!

## 2022-04-13 ENCOUNTER — APPOINTMENT (OUTPATIENT)
Dept: SURGERY | Facility: CLINIC | Age: 22
End: 2022-04-13

## 2022-04-14 LAB
AMPHET UR-MCNC: NEGATIVE — SIGNIFICANT CHANGE UP
BARBITURATES, URINE.: NEGATIVE — SIGNIFICANT CHANGE UP
BENZODIAZ UR-MCNC: NEGATIVE — SIGNIFICANT CHANGE UP
COCAINE METAB.OTHER UR-MCNC: NEGATIVE — SIGNIFICANT CHANGE UP
CREATININE, URINE THERAPEUTIC: 131.6 MG/DL — SIGNIFICANT CHANGE UP
METHADONE UR-MCNC: NEGATIVE — SIGNIFICANT CHANGE UP
METHAQUALONE UR QL: NEGATIVE — SIGNIFICANT CHANGE UP
METHAQUALONE UR-MCNC: NEGATIVE — SIGNIFICANT CHANGE UP
OPIATES UR-MCNC: NEGATIVE — SIGNIFICANT CHANGE UP
PCP UR-MCNC: NEGATIVE — SIGNIFICANT CHANGE UP
PROPOXYPH UR QL: NEGATIVE — SIGNIFICANT CHANGE UP
THC UR QL: NEGATIVE — SIGNIFICANT CHANGE UP

## 2022-04-15 ENCOUNTER — APPOINTMENT (OUTPATIENT)
Dept: SURGERY | Facility: CLINIC | Age: 22
End: 2022-04-15
Payer: MEDICAID

## 2022-04-15 VITALS
TEMPERATURE: 97.2 F | BODY MASS INDEX: 31.25 KG/M2 | DIASTOLIC BLOOD PRESSURE: 74 MMHG | HEART RATE: 80 BPM | OXYGEN SATURATION: 99 % | HEIGHT: 59 IN | SYSTOLIC BLOOD PRESSURE: 112 MMHG | WEIGHT: 155 LBS

## 2022-04-15 PROCEDURE — 99212 OFFICE O/P EST SF 10 MIN: CPT

## 2022-05-06 ENCOUNTER — APPOINTMENT (OUTPATIENT)
Dept: SURGERY | Facility: CLINIC | Age: 22
End: 2022-05-06
Payer: MEDICAID

## 2022-05-06 VITALS
HEIGHT: 59 IN | SYSTOLIC BLOOD PRESSURE: 118 MMHG | HEART RATE: 103 BPM | TEMPERATURE: 97.2 F | BODY MASS INDEX: 31.45 KG/M2 | DIASTOLIC BLOOD PRESSURE: 80 MMHG | OXYGEN SATURATION: 97 % | WEIGHT: 156 LBS

## 2022-05-06 PROCEDURE — 99213 OFFICE O/P EST LOW 20 MIN: CPT

## 2022-05-06 NOTE — ASSESSMENT
[FreeTextEntry1] : 22yo female s/p pilonidal cyst excision 3/31/2022. complains of constipation. AMS post-op.\par -daily wound care - instructions given to patient to remove dressing prior to shower and keep wound dry and covered between showers\par -AMS, forgetfulness - recommend neurology evaluation\par -constipation - regimen given with dulcolax suppository, miralax and plenty of water\par -return to office in 3 weeks - call or come sooner if issues or concerns

## 2022-05-06 NOTE — HISTORY OF PRESENT ILLNESS
[de-identified] : 20yo female with PMHx of pilonidal cyst s/p excision of pilonidal cyst 3/31/2022 presenting for wound check. She post-operatively was admitted for altered mental status. After extensive work up and evaluation by psych and neuro, patient was discharged. At this time, patient reports a scant amount of drainage from the wound but denies fever/chills, pus, or incisional pain. She complains of constipation with painful bowel movements. Additionally, she states that she has been noticing short-term memory loss and confusion with dates. She is scheduled to see neurology next month.

## 2022-05-06 NOTE — PHYSICAL EXAM
[de-identified] : 2x1cm wound with good granulation tissue, no expressible drainage, no induration or erythema, +visible vicryl sutures in wound

## 2022-05-10 NOTE — PHYSICAL EXAM
[Normal Heart Sounds] : normal heart sounds [Alert] : alert [Oriented to Person] : oriented to person [Oriented to Place] : oriented to place [Oriented to Time] : oriented to time [de-identified] : NAD [de-identified] : healing pilonidal cyst with superficial dehiscence, good granulation tuissue, no erythema or induration

## 2022-05-10 NOTE — PLAN
[FreeTextEntry1] : RTO in 3 weeks\par Discussed with pt and mom that this is normal stages of healing, and will granulate in after dehiscence \par Encouraged continued wound care\par Mortrin prn for pain \par Ok to return to work/school\par \par Fabi Samayoa MD MIS Fellow

## 2022-05-10 NOTE — HISTORY OF PRESENT ILLNESS
[de-identified] : 22 yo female s/p pilonidal cyst excision on 3/31. She has been seen several times in the office and in the ED with concern over wound dehiscence. There is separation at the skin level but the wound has no signs of infection. There is good granulation tissue and only serosang drainage. There is no redness or induration around around the wound. She is only having minor pain that is controlled with motrin. She is fasting for Ramadan and is c/o a headache, encourage hydration. \par She denies fevers, chills or other issues.

## 2022-05-10 NOTE — ADDENDUM
[FreeTextEntry1] : I have seen and examined the patient and participated in patient care. Agree with above, follow up in 1 week

## 2022-05-10 NOTE — ASSESSMENT
[FreeTextEntry1] : 20 yo s/p pilonidal cyst excision 3/31 with superficial wound dehiscence, otherwise no signs of infection

## 2022-05-23 NOTE — HISTORY OF PRESENT ILLNESS
[de-identified] : 20 yo female s/p pilonidal cystectomy last Thursday. She was admitted post-op for encephalopathy and improved, despite a negative work up. \par She has had stable pain, but it is controlled with motrin. She had an episode of worsening pain after sitting yesterday but also was able to control with mortin. She denies fevers, chills or N/V. \par She has noticed some bloody fibrinous drainage from the wound and has to keep a dressing over it. She came in today to be evaluated for this drainage.

## 2022-05-23 NOTE — PLAN
[FreeTextEntry1] : -Ok to shower and clean the area daily, pat dry and place dressing/pad\par -Continue mortin prn for pain \par -Minimize sitting on hard surfaces as able, work/school excuse given if needed\par -RTO Friday for wound check\par -Seen with Dr Hinojosa\par \par Fabi Samayoa MD MIS Fellow

## 2022-05-23 NOTE — PHYSICAL EXAM
[Normal Breath Sounds] : Normal breath sounds [Normal Heart Sounds] : normal heart sounds [de-identified] : no distress [de-identified] : gluteal cleft examined and no erythema- no purulent drainage. The wound has two small opening superiorly and inferiorly where there is some scant bloody drainage able to be expressed, no signs of active infection

## 2022-06-02 ENCOUNTER — APPOINTMENT (OUTPATIENT)
Dept: NEUROLOGY | Facility: CLINIC | Age: 22
End: 2022-06-02
Payer: MEDICAID

## 2022-06-02 VITALS
WEIGHT: 145 LBS | OXYGEN SATURATION: 99 % | DIASTOLIC BLOOD PRESSURE: 84 MMHG | BODY MASS INDEX: 28.47 KG/M2 | HEIGHT: 60 IN | TEMPERATURE: 97.8 F | HEART RATE: 94 BPM | SYSTOLIC BLOOD PRESSURE: 128 MMHG

## 2022-06-02 PROCEDURE — 99215 OFFICE O/P EST HI 40 MIN: CPT

## 2022-06-02 NOTE — ASSESSMENT
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 She had a recent episode of emotional lability and regression of child-like behavior after receiving F in a class. Today, she was emotional when her mother and brother recounted her sx. It is likely psychological, but given hx of COVID, no hx of psych issue nor FMHx of neurodegen/psych dz, will do MRI waw to make sure.\par \par PLAN:\par -MRI H waw\par -24hr aEEG\par -F/u with the names of anesthesia used in her operation \par -Find names of the anesthesia \par -F/u after EEG

## 2022-06-02 NOTE — HISTORY OF PRESENT ILLNESS
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  \par \par He was found to be paranoid, responding to internal stimuli, spontaneously crying post-op. \par \par REEG on 4/1/22 was NEG \par \par She was seen by Dr. Garry Nelson who recommended that if she did not return to baseline after 12-24hr, would do MRI H waw. \par \par TSH 3.72 \par B12 422 \par Utox NEG  \par \par Today, she presents with mom and brother who state that last Friday after she got an F in a class, she had a episode of emotional lability and ?regression to child-like ways. On Friday, she had a 30 minute episode of ?delirium and then on Saturday she was "acting like a 4 year old"/wanted to be surrounded by family. She saw her father praying and starting laughing out of character. She went to sleep for a few minutes and then didn't remember what happened prior. Episode resolved in about 3hrs. Brother says she may have had ?nervous breakdown. \par \par Per mom, pt is slightly OCD about work, never procrastinates, straight A student. In the family, the parents center around the children. \par \par Pt denies sadness or depression, but has chronic sleep latency issue. \par Had COVID about 1 year ago and has been more lethargic since then, never admitted\par Planning to go to [place] for vacation 6/11\par Denies FMHx of Neurodegenerative/psych dz \par \par \par \par

## 2022-06-02 NOTE — REASON FOR VISIT
[Follow-Up: _____] : a [unfilled] follow-up visit [Parent] : parent [Family Member] : family member [FreeTextEntry1] : See below

## 2022-06-10 ENCOUNTER — APPOINTMENT (OUTPATIENT)
Dept: SURGERY | Facility: CLINIC | Age: 22
End: 2022-06-10
Payer: MEDICAID

## 2022-06-10 VITALS
OXYGEN SATURATION: 99 % | HEART RATE: 99 BPM | HEIGHT: 60 IN | SYSTOLIC BLOOD PRESSURE: 120 MMHG | DIASTOLIC BLOOD PRESSURE: 80 MMHG | WEIGHT: 160 LBS | TEMPERATURE: 97.3 F | BODY MASS INDEX: 31.41 KG/M2

## 2022-06-10 PROCEDURE — 99212 OFFICE O/P EST SF 10 MIN: CPT

## 2022-06-10 NOTE — HISTORY OF PRESENT ILLNESS
[de-identified] : 22yo female with PMHx of pilonidal cyst s/p excision of pilonidal cyst 3/31/2022 presenting for wound check. She post-operatively was admitted for altered mental status. After extensive work up and evaluation by psych and neuro, patient was discharged. At this time, patient reports a scant amount of drainage from the wound. The skin is not completely closed. Denies fever/chills, pus, or incisional pain. Regarding altered mental status, patient has been seen by neurologist and undergoing work up with EEG. She will be traveling to [place] for 3 weeks.

## 2022-06-10 NOTE — PHYSICAL EXAM
[de-identified] : 0.5x0.5cm wound with good granulation tissue, no expressible drainage, no induration or erythema, shallow, no tunneling

## 2022-06-10 NOTE — ASSESSMENT
[FreeTextEntry1] : 22yo female s/p pilonidal cyst excision 3/31/2022. AMS post-op.\par -local wound care - apply dry gauze daily\par -cleared to use pool while on vacation\par -AMS - f/u with neurology, planning EEG for after she returns from [place]\par -return to office in 1 month

## 2022-07-05 ENCOUNTER — APPOINTMENT (OUTPATIENT)
Dept: NEUROLOGY | Facility: CLINIC | Age: 22
End: 2022-07-05

## 2022-07-05 ENCOUNTER — OUTPATIENT (OUTPATIENT)
Dept: OUTPATIENT SERVICES | Facility: HOSPITAL | Age: 22
LOS: 1 days | Discharge: HOME | End: 2022-07-05

## 2022-07-05 DIAGNOSIS — Z98.890 OTHER SPECIFIED POSTPROCEDURAL STATES: Chronic | ICD-10-CM

## 2022-07-05 DIAGNOSIS — R41.82 ALTERED MENTAL STATUS, UNSPECIFIED: ICD-10-CM

## 2022-07-05 PROCEDURE — 95719 EEG PHYS/QHP EA INCR W/O VID: CPT

## 2022-07-05 PROCEDURE — 95708 EEG WO VID EA 12-26HR UNMNTR: CPT

## 2022-07-05 PROCEDURE — 70553 MRI BRAIN STEM W/O & W/DYE: CPT | Mod: 26

## 2022-07-06 ENCOUNTER — APPOINTMENT (OUTPATIENT)
Dept: NEUROLOGY | Facility: CLINIC | Age: 22
End: 2022-07-06

## 2022-07-06 PROCEDURE — ZZZZZ: CPT

## 2022-07-08 ENCOUNTER — NON-APPOINTMENT (OUTPATIENT)
Age: 22
End: 2022-07-08

## 2022-07-08 ENCOUNTER — APPOINTMENT (OUTPATIENT)
Dept: SURGERY | Facility: CLINIC | Age: 22
End: 2022-07-08

## 2022-07-31 ENCOUNTER — NON-APPOINTMENT (OUTPATIENT)
Age: 22
End: 2022-07-31

## 2022-08-04 ENCOUNTER — APPOINTMENT (OUTPATIENT)
Dept: NEUROLOGY | Facility: CLINIC | Age: 22
End: 2022-08-04

## 2022-08-09 ENCOUNTER — NON-APPOINTMENT (OUTPATIENT)
Age: 22
End: 2022-08-09

## 2022-08-10 ENCOUNTER — APPOINTMENT (OUTPATIENT)
Dept: NEUROLOGY | Facility: CLINIC | Age: 22
End: 2022-08-10

## 2022-08-10 ENCOUNTER — APPOINTMENT (OUTPATIENT)
Dept: SURGERY | Facility: CLINIC | Age: 22
End: 2022-08-10

## 2022-08-10 VITALS
BODY MASS INDEX: 31.22 KG/M2 | DIASTOLIC BLOOD PRESSURE: 80 MMHG | WEIGHT: 159 LBS | TEMPERATURE: 97.3 F | OXYGEN SATURATION: 99 % | HEART RATE: 79 BPM | SYSTOLIC BLOOD PRESSURE: 112 MMHG | HEIGHT: 60 IN

## 2022-08-10 VITALS
SYSTOLIC BLOOD PRESSURE: 121 MMHG | OXYGEN SATURATION: 98 % | WEIGHT: 159 LBS | DIASTOLIC BLOOD PRESSURE: 82 MMHG | TEMPERATURE: 97.6 F | BODY MASS INDEX: 31.22 KG/M2 | HEART RATE: 103 BPM | HEIGHT: 60 IN

## 2022-08-10 DIAGNOSIS — L05.91 PILONIDAL CYST W/OUT ABSCESS: ICD-10-CM

## 2022-08-10 DIAGNOSIS — R55 SYNCOPE AND COLLAPSE: ICD-10-CM

## 2022-08-10 PROCEDURE — 99024 POSTOP FOLLOW-UP VISIT: CPT

## 2022-08-10 PROCEDURE — 99214 OFFICE O/P EST MOD 30 MIN: CPT

## 2022-08-10 NOTE — ASSESSMENT
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 However, since, she has had one episode of emotional lability/regression and a syncopal episode when she was COVID positive. I think there is psychological overlay. The syncopal episode in Aug may have been vasovagal compounded with dehydration, bc episode lasted about 1 min and she got up and walked to bathroom. NIHSS 0 today, but I'm concerned about vomiting and nausea? I ordered MRI H to r/o stroke, but will check with Dr. Garry Nelson. \par \par PLAN:\par -MRI H to r/o stroke\par -Will message Dr. Garry Nelson to see what other w/u is needed bc I think there s psychological overlay\par -Consider 30 day MCOT for syncope and collapse\par -F/u with Dr. Garry Nelson in 2 mo as per patient and mother's request

## 2022-08-10 NOTE — HISTORY OF PRESENT ILLNESS
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 Last visit in clinic she reported an episode of emotional lability and regression of child-like behavior after receiving F in a class.  \par \par Since, \par MRI H wawwas NEG, no abnormal enhancement or other pathology  \par 24hr aEEG also NEG\par \par She was found to have COVID 8/4 and on the same day, she had episode of acute onset dizziness w/ room spinning and then she described as everything going black and she fell face down onto the floor, denies LOC, lasted 1 min and was unwitnessed. She denies focal weakness. Then she got up, walked to the bathroom and had n/v. Several hours after fall, she found a cut over one breast and a bruise over the other. \par \par Mom then described another incident that Friday where she woke pt up to give cough meds in the middle of the night and asked the patient to pray, and pt could not remember how to put clothes on. Pt states this was nothering and she was still asleep.\par \par Mom also recounted episode in July on trip in [place] where patient was in hot room with windows closed and had vomiting. \par \par No significant FMHx of neurological issues like seizures or cardiac condition\par \par \par

## 2022-08-10 NOTE — PHYSICAL EXAM
[FreeTextEntry1] : Focal neurological exam:\par \par MS: Awake, alert, oriented to person, place, situation and time. Normal affect. Follows commands. \par \par Language: Speech is clear, fluent with good repetition & comprehension. No dysarthria. \par \par CNs 2 - 12 intact. EOMI no nystagmus, no diplopia. VFF. No facial asymmetry b/l, full eye closure strength b/l. Hearing grossly normal. Head turning & shoulder shrug intact b/l. Tongue midline, normal movements, no atrophy.\par \par Motor: Normal muscle bulk & tone. No noticeable tremor or seizure. No pronator drift. Muscle strength of b/l UE and b/l LE 5/5. \par \par Reflexes: DTR of biceps 2+, knees 1+ \par \par Sensation: Intact to LT, temperature and vibration b/l throughout\par \par Cortical: No extinction\par \par Coordination: No dysmetria to FTN.\par \par Gait: No postural instability. Normal stance and tandem gait.\par \par NIHSS 0\par \par \par \par \par

## 2022-08-11 PROBLEM — L05.91 PILONIDAL CYST: Status: ACTIVE | Noted: 2022-03-11

## 2022-08-11 NOTE — ASSESSMENT
[FreeTextEntry1] : 22yo female s/p pilonidal cyst excision 3/31/2022. AMS post-op.\par -monitor wound pain \par -AMS - f/u with neurology\par -names of anesthetics given during surgery reviewed from chart and written down for patient as she is concerned one of the medications may have caused the episodes of AMS - sevoflurane, fentanyl, propofol, rocuronium\par -return to office PRN

## 2022-08-11 NOTE — PHYSICAL EXAM
[de-identified] : completely closed wound with point tenderness, no sinuses or expressible drainage, no induration or fluctuance, no overlying skin changes

## 2022-08-11 NOTE — HISTORY OF PRESENT ILLNESS
[de-identified] : 20yo female with PMHx of pilonidal cyst s/p excision of pilonidal cyst 3/31/2022 presenting for wound check. She post-operatively was admitted for altered mental status. After extensive work up and evaluation by psych and neuro, patient was discharged. At this time, patient reports localized point tenderness. Denies fever/chills, drainage from wound or change in skin. Regarding altered mental status, work up has been negative thus far. She returns from a 3 week vacation to [place]. Denies any issues while abroad.

## 2022-09-08 ENCOUNTER — RESULT REVIEW (OUTPATIENT)
Age: 22
End: 2022-09-08

## 2022-09-08 ENCOUNTER — OUTPATIENT (OUTPATIENT)
Dept: OUTPATIENT SERVICES | Facility: HOSPITAL | Age: 22
LOS: 1 days | Discharge: HOME | End: 2022-09-08

## 2022-09-08 DIAGNOSIS — Z98.890 OTHER SPECIFIED POSTPROCEDURAL STATES: Chronic | ICD-10-CM

## 2022-09-08 DIAGNOSIS — R55 SYNCOPE AND COLLAPSE: ICD-10-CM

## 2022-09-08 PROCEDURE — 70551 MRI BRAIN STEM W/O DYE: CPT | Mod: 26

## 2022-09-20 ENCOUNTER — NON-APPOINTMENT (OUTPATIENT)
Age: 22
End: 2022-09-20

## 2022-11-25 NOTE — ED PROVIDER NOTE - NSTIMEPROVIDERCAREINITIATE_GEN_ER
Patient calling to find out the results of her INR at the ER visit so she can tell the home care nurse.  Relayed last INR result to patient.  Patient verbalized understanding.    03-Feb-2022 13:13

## 2023-05-13 ENCOUNTER — NON-APPOINTMENT (OUTPATIENT)
Age: 23
End: 2023-05-13

## 2023-08-18 ENCOUNTER — NON-APPOINTMENT (OUTPATIENT)
Age: 23
End: 2023-08-18

## 2023-09-20 ENCOUNTER — APPOINTMENT (OUTPATIENT)
Dept: NEUROLOGY | Facility: CLINIC | Age: 23
End: 2023-09-20
Payer: MEDICAID

## 2023-09-20 DIAGNOSIS — Z84.89 FAMILY HISTORY OF OTHER SPECIFIED CONDITIONS: ICD-10-CM

## 2023-09-20 PROCEDURE — 99215 OFFICE O/P EST HI 40 MIN: CPT

## 2023-09-20 RX ORDER — AMOXICILLIN AND CLAVULANATE POTASSIUM 875; 125 MG/1; MG/1
875-125 TABLET, COATED ORAL
Qty: 20 | Refills: 0 | Status: DISCONTINUED | COMMUNITY
Start: 2022-03-02 | End: 2023-09-20

## 2023-09-20 RX ORDER — LAMOTRIGINE 25 MG/1
25 TABLET ORAL
Refills: 0 | Status: ACTIVE | COMMUNITY

## 2023-09-20 RX ORDER — CLOBETASOL PROPIONATE 0.5 MG/ML
0.05 SOLUTION TOPICAL
Qty: 50 | Refills: 0 | Status: DISCONTINUED | COMMUNITY
Start: 2022-08-02 | End: 2023-09-20

## 2023-09-20 RX ORDER — ONDANSETRON 4 MG/1
4 TABLET ORAL
Qty: 10 | Refills: 0 | Status: DISCONTINUED | COMMUNITY
Start: 2022-08-04 | End: 2023-09-20

## 2023-09-20 RX ORDER — ESCITALOPRAM OXALATE 10 MG/1
10 TABLET ORAL
Refills: 0 | Status: ACTIVE | COMMUNITY

## 2023-09-20 RX ORDER — CLINDAMYCIN PHOSPHATE 1 G/10ML
1 GEL TOPICAL DAILY
Qty: 1 | Refills: 2 | Status: DISCONTINUED | COMMUNITY
Start: 2018-06-01 | End: 2023-09-20

## 2023-09-20 RX ORDER — RISPERIDONE 0.5 MG/1
0.5 TABLET, ORALLY DISINTEGRATING ORAL
Refills: 0 | Status: ACTIVE | COMMUNITY

## 2023-09-20 RX ORDER — KETOCONAZOLE 20.5 MG/ML
2 SHAMPOO, SUSPENSION TOPICAL
Qty: 120 | Refills: 0 | Status: DISCONTINUED | COMMUNITY
Start: 2022-02-11 | End: 2023-09-20

## 2023-09-20 RX ORDER — TRETINOIN 1 MG/G
0.1 CREAM TOPICAL
Qty: 20 | Refills: 0 | Status: DISCONTINUED | COMMUNITY
Start: 2022-03-18 | End: 2023-09-20

## 2023-09-20 RX ORDER — IBUPROFEN 600 MG/1
600 TABLET, FILM COATED ORAL
Qty: 9 | Refills: 0 | Status: DISCONTINUED | COMMUNITY
Start: 2022-03-31 | End: 2023-09-20

## 2023-09-20 RX ORDER — CHOLECALCIFEROL (VITAMIN D3) 1250 MCG
1.25 MG CAPSULE ORAL
Qty: 4 | Refills: 0 | Status: DISCONTINUED | COMMUNITY
Start: 2022-05-19 | End: 2023-09-20

## 2023-09-20 RX ORDER — BENZOYL PEROXIDE 60 MG/ML
6 LIQUID TOPICAL TWICE DAILY
Qty: 1 | Refills: 5 | Status: DISCONTINUED | COMMUNITY
Start: 2018-06-01 | End: 2023-09-20

## 2023-09-20 RX ORDER — SALINE NASAL SPRAY 1.5 OZ
0.65 SOLUTION NASAL
Qty: 44 | Refills: 0 | Status: DISCONTINUED | COMMUNITY
Start: 2022-05-03 | End: 2023-09-20

## 2023-09-20 RX ORDER — BENZONATATE 200 MG/1
200 CAPSULE ORAL
Qty: 21 | Refills: 0 | Status: DISCONTINUED | COMMUNITY
Start: 2022-08-04 | End: 2023-09-20

## 2023-09-20 RX ORDER — FLUTICASONE PROPIONATE 50 UG/1
50 SPRAY, METERED NASAL
Qty: 16 | Refills: 0 | Status: DISCONTINUED | COMMUNITY
Start: 2022-03-11 | End: 2023-09-20

## 2023-09-20 RX ORDER — AMOXICILLIN AND CLAVULANATE POTASSIUM 500; 125 MG/1; 1/1
TABLET, FILM COATED ORAL
Refills: 0 | Status: DISCONTINUED | COMMUNITY
End: 2023-09-20

## 2023-10-27 ENCOUNTER — APPOINTMENT (OUTPATIENT)
Age: 23
End: 2023-10-27
Payer: MEDICAID

## 2023-10-27 PROCEDURE — 95816 EEG AWAKE AND DROWSY: CPT

## 2023-10-28 PROCEDURE — 95708 EEG WO VID EA 12-26HR UNMNTR: CPT

## 2023-10-29 PROCEDURE — 95708 EEG WO VID EA 12-26HR UNMNTR: CPT

## 2023-10-30 ENCOUNTER — APPOINTMENT (OUTPATIENT)
Age: 23
End: 2023-10-30

## 2023-10-30 PROCEDURE — 95700 EEG CONT REC W/VID EEG TECH: CPT

## 2023-10-30 PROCEDURE — 95708 EEG WO VID EA 12-26HR UNMNTR: CPT

## 2023-10-30 PROCEDURE — 95723 EEG PHY/QHP>60<84 HR W/O VID: CPT

## 2023-11-08 ENCOUNTER — NON-APPOINTMENT (OUTPATIENT)
Age: 23
End: 2023-11-08

## 2023-11-13 ENCOUNTER — APPOINTMENT (OUTPATIENT)
Dept: NEUROLOGY | Facility: CLINIC | Age: 23
End: 2023-11-13
Payer: MEDICAID

## 2023-11-13 DIAGNOSIS — R41.82 ALTERED MENTAL STATUS, UNSPECIFIED: ICD-10-CM

## 2023-11-13 PROCEDURE — 99215 OFFICE O/P EST HI 40 MIN: CPT

## 2023-11-21 ENCOUNTER — NON-APPOINTMENT (OUTPATIENT)
Age: 23
End: 2023-11-21

## 2023-11-21 LAB
AMPA-R ABCBA: NEGATIVE
AMPHIPHYSIN IGG TITR SER IF: NEGATIVE
ANNOTATION COMMENT IMP: NORMAL
CASPR2-IGG CBA, S: NEGATIVE
CV2 IGG TITR SER: NEGATIVE
GABA-B ABCBA: NEGATIVE
GAD65 AB SER-MCNC: 0 NMOL/L
GLIAL NUC TYPE 1 AB TITR SER: NEGATIVE
HU1 AB TITR SER: NEGATIVE
HU2 AB TITR SER IF: NEGATIVE
HU3 AB TITR SER: NEGATIVE
IGLON5 IFA, S: NEGATIVE
IMMUNOLOGIST REVIEW: NORMAL
LGI1-IGG CBA, S: NEGATIVE
NIF IFA, S: NEGATIVE
NMDA-R ABCBA: NEGATIVE
PCA-1 AB TITR SER: NEGATIVE
PCA-2 AB TITR SER: NEGATIVE
PCA-TR AB TITR SER: NEGATIVE

## 2024-01-24 ENCOUNTER — NON-APPOINTMENT (OUTPATIENT)
Age: 24
End: 2024-01-24

## 2024-02-20 NOTE — ED PROVIDER NOTE - DATE/TIME 2
Your mammogram is normal.  A repeat mammogram in 1 year is recommended.  We will see you at your next visit.   Please call the office if you have any questions or concerns.    Take care,  Ivone Coates     03-Feb-2022 18:50

## 2024-05-24 NOTE — ASU PATIENT PROFILE, ADULT - HAVE YOU HAD A FIRST COVID-19 BOOSTER?
Caudal    Patient location during procedure: OR  Block not for primary anesthetic.  Reason for block: at surgeon's request, post-op pain management   Post-op Pain Location: penis  Start time: 5/24/2024 11:22 AM  Timeout: 5/24/2024 11:20 AM  End time: 5/24/2024 11:25 AM    Staffing  Performing Provider: Sandra Cartwright MD  Authorizing Provider: Sandra Cartwright MD    Staffing  Performed by: Sandra Cartwright MD  Authorized by: Sandra Cartwright MD        Preanesthetic Checklist  Completed: patient identified, IV checked, site marked, risks and benefits discussed, surgical consent, monitors and equipment checked, pre-op evaluation, timeout performed, anesthesia consent given, hand hygiene performed and patient being monitored  Preparation  Patient position: left lateral decubitus  Prep: ChloraPrep  Patient monitoring: ECG, Pulse Ox, continuous capnometry and Blood Pressure Block not for primary anesthetic.  Epidural  Administration type: single shot  Approach: midline  Interspace: Sacral Hiatus    Block type: caudal.  Needle and Epidural Catheter  Needle type: Angiocath   Needle gauge: 22  Catheter type: none  Insertion Attempts: 2  Test dose: 2 mL  Additional Documentation: incremental injection, negative aspiration for heme and CSF and no signs/symptoms of IV or SA injection  Needle localization: anatomical landmarks  Medications:  Volume per aspiration: 3 mL   Assessment  Ease of block: easy No inadvertent dural puncture with Tuohy.  Dural puncture not performed with spinal needle    Medications:    Medications: bupivacaine (pf) (MARCAINE) injection 0.25% - Epidural   9 mL - 5/24/2024 11:23:00 AM           Yes

## 2024-05-30 ENCOUNTER — NON-APPOINTMENT (OUTPATIENT)
Age: 24
End: 2024-05-30

## 2024-07-15 ENCOUNTER — EMERGENCY (EMERGENCY)
Facility: HOSPITAL | Age: 24
LOS: 0 days | Discharge: HOME | End: 2024-07-15
Attending: EMERGENCY MEDICINE
Payer: MEDICAID

## 2024-07-15 VITALS
SYSTOLIC BLOOD PRESSURE: 106 MMHG | TEMPERATURE: 99 F | OXYGEN SATURATION: 98 % | HEART RATE: 100 BPM | RESPIRATION RATE: 22 BRPM | DIASTOLIC BLOOD PRESSURE: 73 MMHG

## 2024-07-15 DIAGNOSIS — S09.90XA UNSPECIFIED INJURY OF HEAD, INITIAL ENCOUNTER: ICD-10-CM

## 2024-07-15 DIAGNOSIS — F41.9 ANXIETY DISORDER, UNSPECIFIED: ICD-10-CM

## 2024-07-15 DIAGNOSIS — Z98.890 OTHER SPECIFIED POSTPROCEDURAL STATES: Chronic | ICD-10-CM
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
## 2024-07-15 PROCEDURE — 99284 EMERGENCY DEPT VISIT MOD MDM: CPT

## 2024-07-15 PROCEDURE — 70450 CT HEAD/BRAIN W/O DYE: CPT | Mod: 26,MC

## 2024-07-15 PROCEDURE — 99285 EMERGENCY DEPT VISIT HI MDM: CPT | Mod: 25

## 2024-07-15 PROCEDURE — 70450 CT HEAD/BRAIN W/O DYE: CPT | Mod: MC

## 2024-10-05 ENCOUNTER — NON-APPOINTMENT (OUTPATIENT)
Age: 24
End: 2024-10-05

## 2024-10-23 ENCOUNTER — APPOINTMENT (OUTPATIENT)
Dept: NEUROLOGY | Facility: CLINIC | Age: 24
End: 2024-10-23

## 2025-01-13 NOTE — BRIEF OPERATIVE NOTE - NSICDXBRIEFPREOP_GEN_ALL_CORE_FT
PRE-OP DIAGNOSIS:  Pilonidal cyst 31-Mar-2022 14:00:00  Shari Méndez   Patient requests all Lab, Cardiology, and Radiology Results on their Discharge Instructions

## 2025-08-09 ENCOUNTER — NON-APPOINTMENT (OUTPATIENT)
Age: 25
End: 2025-08-09